# Patient Record
Sex: FEMALE | Race: WHITE
[De-identification: names, ages, dates, MRNs, and addresses within clinical notes are randomized per-mention and may not be internally consistent; named-entity substitution may affect disease eponyms.]

---

## 2017-05-12 ENCOUNTER — HOSPITAL ENCOUNTER (INPATIENT)
Dept: HOSPITAL 47 - EC | Age: 67
LOS: 3 days | Discharge: NURSING, REHAB OR LONG-TERM CARE | DRG: 871 | End: 2017-05-15
Payer: MEDICARE

## 2017-05-12 VITALS — BODY MASS INDEX: 25 KG/M2

## 2017-05-12 DIAGNOSIS — E44.0: ICD-10-CM

## 2017-05-12 DIAGNOSIS — E86.0: ICD-10-CM

## 2017-05-12 DIAGNOSIS — K59.09: ICD-10-CM

## 2017-05-12 DIAGNOSIS — G89.29: ICD-10-CM

## 2017-05-12 DIAGNOSIS — G82.50: ICD-10-CM

## 2017-05-12 DIAGNOSIS — B96.4: ICD-10-CM

## 2017-05-12 DIAGNOSIS — Z87.891: ICD-10-CM

## 2017-05-12 DIAGNOSIS — I10: ICD-10-CM

## 2017-05-12 DIAGNOSIS — Z79.4: ICD-10-CM

## 2017-05-12 DIAGNOSIS — Z87.440: ICD-10-CM

## 2017-05-12 DIAGNOSIS — F03.90: ICD-10-CM

## 2017-05-12 DIAGNOSIS — G93.41: ICD-10-CM

## 2017-05-12 DIAGNOSIS — J44.9: ICD-10-CM

## 2017-05-12 DIAGNOSIS — E11.65: ICD-10-CM

## 2017-05-12 DIAGNOSIS — F32.9: ICD-10-CM

## 2017-05-12 DIAGNOSIS — R74.8: ICD-10-CM

## 2017-05-12 DIAGNOSIS — G35: ICD-10-CM

## 2017-05-12 DIAGNOSIS — N39.0: ICD-10-CM

## 2017-05-12 DIAGNOSIS — N17.9: ICD-10-CM

## 2017-05-12 DIAGNOSIS — A41.9: Primary | ICD-10-CM

## 2017-05-12 DIAGNOSIS — Z99.3: ICD-10-CM

## 2017-05-12 DIAGNOSIS — Z79.899: ICD-10-CM

## 2017-05-12 DIAGNOSIS — E87.0: ICD-10-CM

## 2017-05-12 DIAGNOSIS — E07.9: ICD-10-CM

## 2017-05-12 DIAGNOSIS — D63.8: ICD-10-CM

## 2017-05-12 DIAGNOSIS — E87.6: ICD-10-CM

## 2017-05-12 DIAGNOSIS — M54.5: ICD-10-CM

## 2017-05-12 LAB
ALP SERPL-CCNC: 80 U/L (ref 38–126)
ALT SERPL-CCNC: 24 U/L (ref 9–52)
ANION GAP SERPL CALC-SCNC: 22 MMOL/L
APTT BLD: 22.2 SEC (ref 22–30)
AST SERPL-CCNC: 16 U/L (ref 14–36)
BASOPHILS # BLD AUTO: 0 K/UL (ref 0–0.2)
BASOPHILS NFR BLD AUTO: 0 %
BUN SERPL-SCNC: 34 MG/DL (ref 7–17)
CALCIUM SPEC-MCNC: 11.3 MG/DL (ref 8.4–10.2)
CH: 26.4
CHCM: 31.3
CHLORIDE SERPL-SCNC: 106 MMOL/L (ref 98–107)
CK SERPL-CCNC: 38 U/L (ref 30–135)
CK SERPL-CCNC: 46 U/L (ref 30–135)
CO2 SERPL-SCNC: 22 MMOL/L (ref 22–30)
EOSINOPHIL # BLD AUTO: 0 K/UL (ref 0–0.7)
EOSINOPHIL NFR BLD AUTO: 0 %
ERYTHROCYTE [DISTWIDTH] IN BLOOD BY AUTOMATED COUNT: 5.04 M/UL (ref 3.8–5.4)
ERYTHROCYTE [DISTWIDTH] IN BLOOD: 14.5 % (ref 11.5–15.5)
GLUCOSE BLD-MCNC: 137 MG/DL (ref 75–99)
GLUCOSE SERPL-MCNC: 388 MG/DL (ref 74–99)
GLUCOSE UR QL: (no result)
HCT VFR BLD AUTO: 42.7 % (ref 34–46)
HDW: 2.5
HGB BLD-MCNC: 13.3 GM/DL (ref 11.4–16)
INR PPP: 1.1 (ref ?–1.1)
KETONES UR QL STRIP.AUTO: (no result)
LUC NFR BLD AUTO: 1 %
LYMPHOCYTES # SPEC AUTO: 1.2 K/UL (ref 1–4.8)
LYMPHOCYTES NFR SPEC AUTO: 10 %
MCH RBC QN AUTO: 26.3 PG (ref 25–35)
MCHC RBC AUTO-ENTMCNC: 31 G/DL (ref 31–37)
MCV RBC AUTO: 84.7 FL (ref 80–100)
MONOCYTES # BLD AUTO: 0.3 K/UL (ref 0–1)
MONOCYTES NFR BLD AUTO: 3 %
NEUTROPHILS # BLD AUTO: 9.9 K/UL (ref 1.3–7.7)
NEUTROPHILS NFR BLD AUTO: 87 %
NON-AFRICAN AMERICAN GFR(MDRD): >60
PARTICLE COUNT: (no result)
PH UR: 6.5 [PH] (ref 5–8)
POTASSIUM SERPL-SCNC: 4.6 MMOL/L (ref 3.5–5.1)
PROT SERPL-MCNC: 8.5 G/DL (ref 6.3–8.2)
PROT UR QL: (no result)
PT BLD: 10.9 SEC (ref 9–12)
RBC UR QL: 95 /HPF (ref 0–5)
SODIUM SERPL-SCNC: 150 MMOL/L (ref 137–145)
SP GR UR: 1.02 (ref 1–1.03)
TROPONIN I SERPL-MCNC: 0.3 NG/ML (ref 0–0.03)
TROPONIN I SERPL-MCNC: 0.37 NG/ML (ref 0–0.03)
UA BILLING (MACRO VS. MICRO): (no result)
UROBILINOGEN UR QL STRIP: <2 MG/DL (ref ?–2)
WBC # BLD AUTO: 0.08 10*3/UL
WBC # BLD AUTO: 11.5 K/UL (ref 3.8–10.6)
WBC #/AREA URNS HPF: 84 /HPF (ref 0–5)
WBC (PEROX): 10.89

## 2017-05-12 PROCEDURE — 83036 HEMOGLOBIN GLYCOSYLATED A1C: CPT

## 2017-05-12 PROCEDURE — 71010: CPT

## 2017-05-12 PROCEDURE — 87077 CULTURE AEROBIC IDENTIFY: CPT

## 2017-05-12 PROCEDURE — 85025 COMPLETE CBC W/AUTO DIFF WBC: CPT

## 2017-05-12 PROCEDURE — 84484 ASSAY OF TROPONIN QUANT: CPT

## 2017-05-12 PROCEDURE — 93005 ELECTROCARDIOGRAM TRACING: CPT

## 2017-05-12 PROCEDURE — 80061 LIPID PANEL: CPT

## 2017-05-12 PROCEDURE — 82550 ASSAY OF CK (CPK): CPT

## 2017-05-12 PROCEDURE — 80053 COMPREHEN METABOLIC PANEL: CPT

## 2017-05-12 PROCEDURE — 80048 BASIC METABOLIC PNL TOTAL CA: CPT

## 2017-05-12 PROCEDURE — 82553 CREATINE MB FRACTION: CPT

## 2017-05-12 PROCEDURE — 82140 ASSAY OF AMMONIA: CPT

## 2017-05-12 PROCEDURE — 36415 COLL VENOUS BLD VENIPUNCTURE: CPT

## 2017-05-12 PROCEDURE — 81001 URINALYSIS AUTO W/SCOPE: CPT

## 2017-05-12 PROCEDURE — 71275 CT ANGIOGRAPHY CHEST: CPT

## 2017-05-12 PROCEDURE — 85049 AUTOMATED PLATELET COUNT: CPT

## 2017-05-12 PROCEDURE — 87086 URINE CULTURE/COLONY COUNT: CPT

## 2017-05-12 PROCEDURE — 83735 ASSAY OF MAGNESIUM: CPT

## 2017-05-12 PROCEDURE — 87186 SC STD MICRODIL/AGAR DIL: CPT

## 2017-05-12 PROCEDURE — 84132 ASSAY OF SERUM POTASSIUM: CPT

## 2017-05-12 PROCEDURE — 85730 THROMBOPLASTIN TIME PARTIAL: CPT

## 2017-05-12 PROCEDURE — 85610 PROTHROMBIN TIME: CPT

## 2017-05-12 RX ADMIN — CEFAZOLIN SCH MLS/HR: 330 INJECTION, POWDER, FOR SOLUTION INTRAMUSCULAR; INTRAVENOUS at 23:16

## 2017-05-12 RX ADMIN — METOPROLOL TARTRATE SCH MG: 50 TABLET, FILM COATED ORAL at 23:41

## 2017-05-12 RX ADMIN — SODIUM CHLORIDE SCH MLS/HR: 450 INJECTION, SOLUTION INTRAVENOUS at 16:53

## 2017-05-12 NOTE — XR
EXAMINATION TYPE: XR chest 1V portable

 

DATE OF EXAM: 5/12/2017 4:40 PM

 

COMPARISON: 9/23/2016

 

HISTORY: Altered mental status

 

TECHNIQUE: Single frontal view of the chest is obtained.

 

FINDINGS:  There is no heart failure nor confluent pneumonic infiltrate. There are no hilar masses. C
ostophrenic angles are clear. There are chest leads. Bony thorax is intact.

 

IMPRESSION:  No active cardiopulmonary disease. There is clearing of some atelectasis in both lungs c
ompared to old exam.

## 2017-05-12 NOTE — ED
General Adult HPI





- General


Chief complaint: Altered Mental Status


Stated complaint: POSS UTI


Time Seen by Provider: 05/12/17 14:40


Source: EMS, RN notes reviewed, old records reviewed


Mode of arrival: EMS





- History of Present Illness


Initial comments: 





This is a 58-year-old female the ER for evaluation.  Patient's brought in from 

a psychiatric facility, recently hospital for evaluation of altered mental 

status.  Patient's over some long-term multiple sclerosis.  Patient herself is 

unable to give a clear history secondary critical condition, history is 

obtained from EMS as well as the patient's chart





- Related Data


 Home Medications











 Medication  Instructions  Recorded  Confirmed


 


Acetaminophen [Tylenol] 650 mg PO Q4H PRN 01/31/15 05/12/17


 


Amitriptyline HCl 25 mg PO HS@2100 01/31/15 05/12/17


 


Docusate Sodium [Dulcolax Stool 200 mg PO DAILY@0900 01/31/15 05/12/17





Softener]   


 


Dronabinol 10 mg PO BID@0900,2100 01/31/15 05/12/17


 


Ergocalciferol [Vitamin D2 50,000 unit PO SA 01/31/15 05/12/17





(DRISDOL)]   


 


Escitalopram [Lexapro] 20 mg PO DAILY@0900 01/31/15 05/12/17


 


Insulin Glargine [Lantus] 30 unit SQ DAILY@0900 01/31/15 05/12/17


 


Levothyroxine Sodium [Synthroid] 75 mcg PO DAILY@0600 01/31/15 05/12/17


 


Lisinopril [Prinivil] 20 mg PO DAILY@0900 01/31/15 05/12/17


 


Melatonin 3 mg PO HS PRN 01/31/15 05/12/17


 


Methenamine Hippurate 1 gm PO BID@0900,2100 01/31/15 05/12/17


 


Omeprazole [PriLOSEC] 20 mg PO DAILY@0600 01/31/15 05/12/17


 


Polyethylene Glycol 3350 [Miralax] 17 gm PO BID@0900,2100 01/31/15 05/12/17


 


Sennosides [Senokot] 17.2 mg PO DAILY@0900 01/31/15 05/12/17


 


Artificial Tears-Hypromellose 1 drop BOTH EYES BID PRN 09/20/16 05/12/17





[Artificial Tear Drops]   


 


Atorvastatin [Lipitor] 20 mg PO HS@2100 09/20/16 05/12/17


 


Bisacodyl 10 mg RECTAL DAILY PRN 09/20/16 05/12/17


 


Calcium Carbonate [Calcium] 600 mg PO BID@0900,2100 09/20/16 05/12/17


 


Fenofibrate Nanocrystallized 160 mg PO HS@2100 09/20/16 05/12/17





[Tricor]   


 


Glucagen Hypokit Solution  1 mg IM Q10M PRN 09/20/16 05/12/17


 


Inzo Barrier Cream 1 applic TOPICAL BID 09/20/16 05/12/17


 


Magnesium Citrate [Citrate of 300 ml PO Q72H PRN 09/20/16 05/12/17





Magnesia]   


 


Naloxegol Oxalate [Movantik] 25 mg PO HS@2100 09/20/16 05/12/17


 


Antifungal Powder 1 applic TOPICAL DAILY 05/12/17 05/12/17


 


Insulin Aspart [NovoLOG Flexpen] See Protocol SQ ACHS 05/12/17 05/12/17


 


amLODIPine [Norvasc] 5 mg PO DAILY@0900 05/12/17 05/12/17








 Previous Rx's











 Medication  Instructions  Recorded


 


Diazepam [Valium] 5 mg PO TID PRN #21 tab 09/26/16


 


fentaNYL 100MCG/HR PATCH 1 patch TRANSDERM Q72H #2 patch 09/26/16





[Duragesic 100MCG/HR]  


 


traMADol HCL [Ultram ER] 300 mg PO DAILY@0600 #7 tab.er.24h 09/26/16











 Allergies











Allergy/AdvReac Type Severity Reaction Status Date / Time


 


No Known Allergies Allergy   Verified 05/12/17 14:39














Review of Systems


ROS Statement: 


Those systems with pertinent positive or pertinent negative responses have been 

documented in the HPI.





ROS Other: All systems not noted in ROS Statement are negative.





Past Medical History


Past Medical History: COPD, CVA/TIA, Diabetes Mellitus, Hypertension, Memory 

Impairment


Additional Past Medical History / Comment(s): UTI, multiple sclerosis, chronic 

constipation, abdominal distention


History of Any Multi-Drug Resistant Organisms: ESBL


Date of last positivie culture/infection: 9/20/16


MDRO Source:: Urine AND BLOOD-E.coli ESBL


Past Surgical History: Adenoidectomy, Tonsillectomy


Additional Past Surgical History / Comment(s): right leg ORIF, multiple scopes


Past Psychological History: No Psychological Hx Reported


Additional Psychological History / Comment(s): resident [facility], hx of ESBL


Smoking Status: Former smoker


Past Alcohol Use History: None Reported


Past Drug Use History: None Reported





- Past Family History


  ** Mother


History Unknown: Yes





  ** Father


History Unknown: Yes





General Exam


Limitations: altered mental status


General appearance: alert, in distress


Head exam: Present: atraumatic, normocephalic, normal inspection


Eye exam: Present: normal appearance, PERRL, EOMI.  Absent: scleral icterus, 

conjunctival injection, periorbital swelling


ENT exam: Present: normal exam, mucous membranes moist


Neck exam: Present: normal inspection.  Absent: tenderness, meningismus, 

lymphadenopathy


Respiratory exam: Present: normal lung sounds bilaterally.  Absent: respiratory 

distress, wheezes, rales, rhonchi, stridor


Cardiovascular Exam: Present: normal rhythm, tachycardia, normal heart sounds.  

Absent: systolic murmur, diastolic murmur, rubs, gallop, clicks


GI/Abdominal exam: Present: soft, normal bowel sounds.  Absent: distended, 

tenderness, guarding, rebound, rigid


Extremities exam: Present: normal inspection, full ROM, normal capillary 

refill.  Absent: tenderness, pedal edema, joint swelling, calf tenderness


Back exam: Present: normal inspection


Neurological exam: Present: alert, oriented X3, CN II-XII intact


Psychiatric exam: Present: normal affect, normal mood


Skin exam: Present: warm, dry, intact, normal color.  Absent: rash





Course


 Vital Signs











  05/12/17





  14:11


 


Temperature 98.6 F


 


Pulse Rate 130 H


 


Respiratory 18





Rate 


 


Blood Pressure 192/89


 


O2 Sat by Pulse 96





Oximetry 














- Reevaluation(s)


Reevaluation #1: 





05/12/17 16:42


She remains clinically same, and still unable to answer questions, persistently 

tachycardic





EKG Findings





- EKG Comments:


EKG Findings:: EKG shows sinus tachycardia rate 127, , QRS I4, 





Medical Decision Making





- Medical Decision Making





66 female the ER for evaluation of severe weakness.  Patient unable to give 

history, this is per staff, this is found to have non-ST elevated MI, severe 

tachycardia.





- Lab Data


Result diagrams: 


 05/12/17 14:20





 05/12/17 14:20


 Lab Results











  05/12/17 05/12/17 05/12/17 Range/Units





  14:20 14:20 14:20 


 


WBC   11.5 H   (3.8-10.6)  k/uL


 


RBC   5.04   (3.80-5.40)  m/uL


 


Hgb   13.3   (11.4-16.0)  gm/dL


 


Hct   42.7   (34.0-46.0)  %


 


MCV   84.7   (80.0-100.0)  fL


 


MCH   26.3   (25.0-35.0)  pg


 


MCHC   31.0   (31.0-37.0)  g/dL


 


RDW   14.5   (11.5-15.5)  %


 


Plt Count   455 H   (150-450)  k/uL


 


Neutrophils %   87   %


 


Lymphocytes %   10   %


 


Monocytes %   3   %


 


Eosinophils %   0   %


 


Basophils %   0   %


 


Neutrophils #   9.9 H   (1.3-7.7)  k/uL


 


Lymphocytes #   1.2   (1.0-4.8)  k/uL


 


Monocytes #   0.3   (0-1.0)  k/uL


 


Eosinophils #   0.0   (0-0.7)  k/uL


 


Basophils #   0.0   (0-0.2)  k/uL


 


Hypochromasia   Slight   


 


PT     (9.0-12.0)  sec


 


INR     (<1.1)  


 


APTT     (22.0-30.0)  sec


 


Sodium    150 H  (137-145)  mmol/L


 


Potassium    4.6  (3.5-5.1)  mmol/L


 


Chloride    106  ()  mmol/L


 


Carbon Dioxide    22  (22-30)  mmol/L


 


Anion Gap    22  mmol/L


 


BUN    34 H  (7-17)  mg/dL


 


Creatinine    0.90  (0.52-1.04)  mg/dL


 


Est GFR (MDRD) Af Amer    >60  (>60 ml/min/1.73 sqM)  


 


Est GFR (MDRD) Non-Af    >60  (>60 ml/min/1.73 sqM)  


 


Glucose    388 H  (74-99)  mg/dL


 


Calcium    11.3 H  (8.4-10.2)  mg/dL


 


Total Bilirubin    0.7  (0.2-1.3)  mg/dL


 


AST    16  (14-36)  U/L


 


ALT    24  (9-52)  U/L


 


Alkaline Phosphatase    80  ()  U/L


 


Ammonia     (<30)  umol/L


 


Total Creatine Kinase  38    ()  U/L


 


CK-MB (CK-2)  2.7 H*    (0.0-2.4)  ng/mL


 


CK-MB (CK-2) Rel Index  7.1    


 


Troponin I  0.305 H*    (0.000-0.034)  ng/mL


 


Total Protein    8.5 H  (6.3-8.2)  g/dL


 


Albumin    4.8  (3.5-5.0)  g/dL














  05/12/17 05/12/17 Range/Units





  14:20 14:20 


 


WBC    (3.8-10.6)  k/uL


 


RBC    (3.80-5.40)  m/uL


 


Hgb    (11.4-16.0)  gm/dL


 


Hct    (34.0-46.0)  %


 


MCV    (80.0-100.0)  fL


 


MCH    (25.0-35.0)  pg


 


MCHC    (31.0-37.0)  g/dL


 


RDW    (11.5-15.5)  %


 


Plt Count    (150-450)  k/uL


 


Neutrophils %    %


 


Lymphocytes %    %


 


Monocytes %    %


 


Eosinophils %    %


 


Basophils %    %


 


Neutrophils #    (1.3-7.7)  k/uL


 


Lymphocytes #    (1.0-4.8)  k/uL


 


Monocytes #    (0-1.0)  k/uL


 


Eosinophils #    (0-0.7)  k/uL


 


Basophils #    (0-0.2)  k/uL


 


Hypochromasia    


 


PT  10.9   (9.0-12.0)  sec


 


INR  1.1   (<1.1)  


 


APTT  22.2   (22.0-30.0)  sec


 


Sodium    (137-145)  mmol/L


 


Potassium    (3.5-5.1)  mmol/L


 


Chloride    ()  mmol/L


 


Carbon Dioxide    (22-30)  mmol/L


 


Anion Gap    mmol/L


 


BUN    (7-17)  mg/dL


 


Creatinine    (0.52-1.04)  mg/dL


 


Est GFR (MDRD) Af Amer    (>60 ml/min/1.73 sqM)  


 


Est GFR (MDRD) Non-Af    (>60 ml/min/1.73 sqM)  


 


Glucose    (74-99)  mg/dL


 


Calcium    (8.4-10.2)  mg/dL


 


Total Bilirubin    (0.2-1.3)  mg/dL


 


AST    (14-36)  U/L


 


ALT    (9-52)  U/L


 


Alkaline Phosphatase    ()  U/L


 


Ammonia   <9  (<30)  umol/L


 


Total Creatine Kinase    ()  U/L


 


CK-MB (CK-2)    (0.0-2.4)  ng/mL


 


CK-MB (CK-2) Rel Index    


 


Troponin I    (0.000-0.034)  ng/mL


 


Total Protein    (6.3-8.2)  g/dL


 


Albumin    (3.5-5.0)  g/dL














- Radiology Data


Radiology results: report reviewed (Chest x-ray is negative for acute disease), 

image reviewed





Critical Care Time


Critical Care Time: Yes


Total Critical Care Time: 31





Disposition


Clinical Impression: 


 Altered mental status, Multiple sclerosis, primary chronic progressive, NSTEMI 

(non-ST elevated myocardial infarction)





Disposition: ADMITTED AS IP TO THIS [facility]


Condition: Serious


Referrals: 


Stromberg,Reid, MD [Primary Care Provider] - 1-2 days

## 2017-05-12 NOTE — CT
EXAMINATION TYPE: CT angio chest

 

DATE OF EXAM: 5/12/2017 6:14 PM

 

COMPARISON: NONE

 

HISTORY: Patient poor historian.  Patient is dehydrated per family and shows increased signs of alter
ed mental status.

 

CT DLP: 591 mGycm

Automated exposure control for dose reduction was used.

 

CONTRAST: 

CTA scan of the thorax is performed with IV Contrast, patient injected with 100 mL of Omnipaque 350, 
pulmonary embolism protocol.  There are 3-D post processed images..  

 

FINDINGS:

 

There is patchy atelectasis in both mid and lower lung fields and worse on the right side. There is s
light elevated right diaphragm. There is no pleural effusion. There is no evidence of thoracic aortic
 aneurysm or dissection. I see no filling defects in the pulmonary arteries. There is no mediastinal 
adenopathy. There are no hilar masses. There is no pericardial effusion. There is some thoracic kypho
tic deformity. There is no evidence of a pulmonary mass.

 

 

IMPRESSION: 

NO EVIDENCE OF PULMONARY EMBOLISM. THERE IS MILD BILATERAL PATCHY ATELECTASIS.

## 2017-05-13 LAB
ALP SERPL-CCNC: 51 U/L (ref 38–126)
ALT SERPL-CCNC: 28 U/L (ref 9–52)
ANION GAP SERPL CALC-SCNC: 8 MMOL/L
AST SERPL-CCNC: 12 U/L (ref 14–36)
BASOPHILS # BLD AUTO: 0 K/UL (ref 0–0.2)
BASOPHILS NFR BLD AUTO: 0 %
BUN SERPL-SCNC: 19 MG/DL (ref 7–17)
CALCIUM SPEC-MCNC: 8.2 MG/DL (ref 8.4–10.2)
CH: 26.2
CHCM: 29.7
CHLORIDE SERPL-SCNC: 120 MMOL/L (ref 98–107)
CHOLEST SERPL-MCNC: 126 MG/DL (ref ?–200)
CK SERPL-CCNC: 54 U/L (ref 30–135)
CO2 SERPL-SCNC: 21 MMOL/L (ref 22–30)
EOSINOPHIL # BLD AUTO: 0.1 K/UL (ref 0–0.7)
EOSINOPHIL NFR BLD AUTO: 1 %
ERYTHROCYTE [DISTWIDTH] IN BLOOD BY AUTOMATED COUNT: 3.85 M/UL (ref 3.8–5.4)
ERYTHROCYTE [DISTWIDTH] IN BLOOD: 14.5 % (ref 11.5–15.5)
GLUCOSE BLD-MCNC: 192 MG/DL (ref 75–99)
GLUCOSE BLD-MCNC: 266 MG/DL (ref 75–99)
GLUCOSE BLD-MCNC: 98 MG/DL (ref 75–99)
GLUCOSE SERPL-MCNC: 177 MG/DL (ref 74–99)
HCT VFR BLD AUTO: 34.1 % (ref 34–46)
HDLC SERPL-MCNC: 51 MG/DL (ref 40–60)
HDW: 2.47
HGB BLD-MCNC: 10.1 GM/DL (ref 11.4–16)
LUC NFR BLD AUTO: 2 %
LYMPHOCYTES # SPEC AUTO: 1.8 K/UL (ref 1–4.8)
LYMPHOCYTES NFR SPEC AUTO: 23 %
MCH RBC QN AUTO: 26.2 PG (ref 25–35)
MCHC RBC AUTO-ENTMCNC: 29.5 G/DL (ref 31–37)
MCV RBC AUTO: 88.7 FL (ref 80–100)
MONOCYTES # BLD AUTO: 0.5 K/UL (ref 0–1)
MONOCYTES NFR BLD AUTO: 6 %
NEUTROPHILS # BLD AUTO: 5.3 K/UL (ref 1.3–7.7)
NEUTROPHILS NFR BLD AUTO: 68 %
NON-AFRICAN AMERICAN GFR(MDRD): >60
POTASSIUM SERPL-SCNC: 3.2 MMOL/L (ref 3.5–5.1)
PROT SERPL-MCNC: 6.1 G/DL (ref 6.3–8.2)
SODIUM SERPL-SCNC: 149 MMOL/L (ref 137–145)
TRIGL SERPL-MCNC: 205 MG/DL (ref ?–150)
TROPONIN I SERPL-MCNC: 0.24 NG/ML (ref 0–0.03)
WBC # BLD AUTO: 0.11 10*3/UL
WBC # BLD AUTO: 7.7 K/UL (ref 3.8–10.6)
WBC (PEROX): 7.58

## 2017-05-13 RX ADMIN — ASPIRIN 325 MG ORAL TABLET SCH: 325 PILL ORAL at 10:24

## 2017-05-13 RX ADMIN — METOPROLOL TARTRATE SCH MG: 50 TABLET, FILM COATED ORAL at 11:06

## 2017-05-13 RX ADMIN — ATORVASTATIN CALCIUM SCH MG: 80 TABLET, FILM COATED ORAL at 10:16

## 2017-05-13 RX ADMIN — SODIUM CHLORIDE SCH MLS/HR: 9 INJECTION, SOLUTION INTRAVENOUS at 16:42

## 2017-05-13 RX ADMIN — NITROGLYCERIN SCH INCH: 20 OINTMENT TOPICAL at 17:24

## 2017-05-13 RX ADMIN — METOPROLOL TARTRATE SCH MG: 50 TABLET, FILM COATED ORAL at 10:16

## 2017-05-13 RX ADMIN — ATORVASTATIN CALCIUM SCH: 80 TABLET, FILM COATED ORAL at 10:24

## 2017-05-13 RX ADMIN — ASPIRIN 325 MG ORAL TABLET SCH MG: 325 PILL ORAL at 11:06

## 2017-05-13 RX ADMIN — METOPROLOL TARTRATE SCH: 50 TABLET, FILM COATED ORAL at 10:24

## 2017-05-13 RX ADMIN — POTASSIUM CHLORIDE SCH MLS/HR: 14.9 INJECTION, SOLUTION INTRAVENOUS at 12:51

## 2017-05-13 RX ADMIN — CEFAZOLIN SCH MLS/HR: 330 INJECTION, POWDER, FOR SOLUTION INTRAMUSCULAR; INTRAVENOUS at 06:54

## 2017-05-13 RX ADMIN — NITROGLYCERIN SCH INCH: 20 OINTMENT TOPICAL at 11:13

## 2017-05-13 RX ADMIN — INSULIN LISPRO SCH UNIT: 100 INJECTION, SOLUTION INTRAVENOUS; SUBCUTANEOUS at 17:23

## 2017-05-13 RX ADMIN — ATORVASTATIN CALCIUM SCH MG: 80 TABLET, FILM COATED ORAL at 11:06

## 2017-05-13 RX ADMIN — ASPIRIN 325 MG ORAL TABLET SCH MG: 325 PILL ORAL at 10:16

## 2017-05-13 RX ADMIN — SODIUM CHLORIDE SCH: 450 INJECTION, SOLUTION INTRAVENOUS at 19:35

## 2017-05-13 RX ADMIN — SODIUM CHLORIDE SCH MLS/HR: 9 INJECTION, SOLUTION INTRAVENOUS at 14:41

## 2017-05-13 RX ADMIN — INSULIN LISPRO SCH: 100 INJECTION, SOLUTION INTRAVENOUS; SUBCUTANEOUS at 12:51

## 2017-05-13 NOTE — P.HPIM
History of Present Illness


H&P Date: 05/13/17


Chief Complaint: Confusion


Keena is a 66-year-old white female well-known to me from [facility] on the [facility].  She has advanced multiple sclerosis and only use of her right 

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  Staff had reported last week 

that she been getting confused off and on.  A urinalysis and chest x-ray was 

done.  The urinalysis was never taken as they were unable to put in a Beck 

catheter.  He had not been contacted about her until yesterday, when she was 

very confused.  They had reported to me over the past several days of 

significantly elevated sugars.  This is not out of the norm for Keena.  Was a 

brittle diabetic, and frequently has hypo-and hyperglycemia.  She was found to 

have a UTI.  She is here for treatment regarding the confusion.  Her only while 

she is awake and alert, she is confused and unable to answer most questions I 

ask her today.  He does recognize me on entering her room.  and asks of my 

family.





Review of Systems


ROS unobtainable: due to mental status





Past Medical History


Past Medical History: Chest Pain / Angina, COPD, CVA/TIA, Diabetes Mellitus, 

Hypertension, Memory Impairment, Neurologic Disorder (Multiple sclerosis), 

Thyroid Disorder


Additional Past Medical History / Comment(s): Current UTI, on suppression 

treatment, chronic constipation, abdominal chronic pain.


History of Any Multi-Drug Resistant Organisms: ESBL


Date of last positivie culture/infection: 9/20/16


MDRO Source:: Urine AND BLOOD-E.coli ESBL


Past Surgical History: Adenoidectomy, Tonsillectomy


Additional Past Surgical History / Comment(s): right leg ORIF, multiple scopes


Past Psychological History: Depression


Additional Psychological History / Comment(s): resident [facility],  of ESBL


Smoking Status: Former smoker


Past Alcohol Use History: None Reported


Past Drug Use History: None Reported





- Past Family History


  ** Mother


History Unknown: Yes





  ** Father


History Unknown: Yes





Medications and Allergies


 Home Medications











 Medication  Instructions  Recorded  Confirmed  Type


 


Acetaminophen [Tylenol] 650 mg PO Q4H PRN 01/31/15 05/12/17 History


 


Amitriptyline HCl 25 mg PO HS@2100 01/31/15 05/12/17 History


 


Docusate Sodium [Dulcolax Stool 200 mg PO DAILY@0900 01/31/15 05/12/17 History





Softener]    


 


Dronabinol 10 mg PO BID@0900,2100 01/31/15 05/12/17 History


 


Ergocalciferol [Vitamin D2 50,000 unit PO SA 01/31/15 05/12/17 History





(DRISDOL)]    


 


Escitalopram [Lexapro] 20 mg PO DAILY@0900 01/31/15 05/12/17 History


 


Insulin Glargine [Lantus] 30 unit SQ DAILY@0900 01/31/15 05/12/17 History


 


Levothyroxine Sodium [Synthroid] 75 mcg PO DAILY@0600 01/31/15 05/12/17 History


 


Lisinopril [Prinivil] 20 mg PO DAILY@0900 01/31/15 05/12/17 History


 


Melatonin 3 mg PO HS PRN 01/31/15 05/12/17 History


 


Methenamine Hippurate 1 gm PO BID@0900,2100 01/31/15 05/12/17 History


 


Omeprazole [PriLOSEC] 20 mg PO DAILY@0600 01/31/15 05/12/17 History


 


Polyethylene Glycol 3350 [Miralax] 17 gm PO BID@0900,2100 01/31/15 05/12/17 

History


 


Sennosides [Senokot] 17.2 mg PO DAILY@0900 01/31/15 05/12/17 History


 


Artificial Tears-Hypromellose 1 drop BOTH EYES BID PRN 09/20/16 05/12/17 History





[Artificial Tear Drops]    


 


Atorvastatin [Lipitor] 20 mg PO HS@2100 09/20/16 05/12/17 History


 


Bisacodyl 10 mg RECTAL DAILY PRN 09/20/16 05/12/17 History


 


Calcium Carbonate [Calcium] 600 mg PO BID@0900,2100 09/20/16 05/12/17 History


 


Fenofibrate Nanocrystallized 160 mg PO HS@2100 09/20/16 05/12/17 History





[Tricor]    


 


Glucagen Hypokit Solution  1 mg IM Q10M PRN 09/20/16 05/12/17 History


 


Inzo Barrier Cream 1 applic TOPICAL BID 09/20/16 05/12/17 History


 


Magnesium Citrate [Citrate of 300 ml PO Q72H PRN 09/20/16 05/12/17 History





Magnesia]    


 


Naloxegol Oxalate [Movantik] 25 mg PO HS@2100 09/20/16 05/12/17 History


 


Antifungal Powder 1 applic TOPICAL DAILY 05/12/17 05/12/17 History


 


Insulin Aspart [NovoLOG Flexpen] See Protocol SQ ACHS 05/12/17 05/12/17 History


 


amLODIPine [Norvasc] 5 mg PO DAILY@0900 05/12/17 05/12/17 History











 Allergies











Allergy/AdvReac Type Severity Reaction Status Date / Time


 


No Known Allergies Allergy   Verified 05/12/17 14:39














Physical Exam


Vitals: 


 Vital Signs











  Temp Pulse Pulse Resp BP BP Pulse Ox


 


 05/13/17 11:52  98.6 F   67  16   157/72  100


 


 05/13/17 08:30  99.0 F   74  16   158/70  99


 


 05/13/17 06:47   73   16  176/92   98


 


 05/13/17 04:28   69   16  177/82   97


 


 05/13/17 01:38   96   16  169/75   97


 


 05/13/17 00:20   98   16  165/79   97


 


 05/12/17 23:10   92   16  180/86   97


 


 05/12/17 19:36   89   16  160/73   97


 


 05/12/17 19:12   95   18  149/81   96


 


 05/12/17 18:45   106 H   18  179/80   96


 


 05/12/17 17:44  100.6 F H   56 L  16   126/77  100


 


 05/12/17 17:04  99.9 F H  126 H   16  185/84   99


 


 05/12/17 17:03   128 H   16  172/85   96








 Intake and Output











 05/12/17 05/13/17 05/13/17





 22:59 06:59 14:59


 


Intake Total  242.87 250


 


Output Total   1000


 


Balance  242.87 -750


 


Intake:   


 


  Intake, IV Titration  242.87 250





  Amount   


 


    Heparin Sodium,Porcine/  242.87 





    D5w Pmx 25,000 unit In   





    Dextrose/Water 1 500ml.   





    bag @ 12 UNITS/KG/HR 17.   





    41 mls/hr IV .Q24H ANUJA Rx   





    #:[id number]   


 


    Sodium Chloride 0.9% 1,   250





    000 ml @ 100 mls/hr IV .   





    Q10H ANUJA Rx#:[id number]   


 


Output:   


 


  Urine   1000


 


Other:   


 


  Voiding Method   Indwelling Catheter


 


  Weight 72.575 kg  














GENERAL: Fatigued, thin and in no acute distress.


HEAD: Atraumatic, normocephalic.


EYES: Pupils equal round and reactive to light, extraocular movements intact, 

sclera anicteric, conjunctiva are normal.


ENT:nares patent, oropharynx clear without exudates.  Moist mucous membranes.


NECK: Normal range of motion, supple without lymphadenopathy or JVD, no 

thyromegaly


LUNGS: Breath sounds clear to auscultation bilaterally and equal.  No wheezes 

rales or rhonchi.


HEART: Regular rate and rhythm without murmurs, rubs or gallops.S1S2 Normal


ABDOMEN: Soft, , normoactive bowel sounds.  No guarding, no rebound.  There 

distended due to chronic fecal retention


EXTREMITIES: Atrophy to all with only use of her right upper extremity.


NEUROLOGICAL: Cranial nerves II through XII grossly intact.  Normal speech, 

nonambulatory, wheelchair bound


PSYCH: Normal mood, normal affect.


SKIN: Warm, Dry, normal turgor, no rashes or lesions noted.





Results


CBC & Chem 7: 


 05/13/17 10:56





 05/13/17 02:40


Labs: 


 Abnormal Lab Results - Last 24 Hours (Table)











  05/12/17 05/12/17 05/12/17 Range/Units





  16:50 20:27 20:50 


 


Hgb     (11.4-16.0)  gm/dL


 


MCHC     (31.0-37.0)  g/dL


 


APTT     (22.0-30.0)  sec


 


Sodium     (137-145)  mmol/L


 


Potassium     (3.5-5.1)  mmol/L


 


Chloride     ()  mmol/L


 


Carbon Dioxide     (22-30)  mmol/L


 


BUN     (7-17)  mg/dL


 


Glucose     (74-99)  mg/dL


 


POC Glucose (mg/dL)   137 H   (75-99)  mg/dL


 


Calcium     (8.4-10.2)  mg/dL


 


AST     (14-36)  U/L


 


CK-MB (CK-2)    2.9 H*  (0.0-2.4)  ng/mL


 


Troponin I    0.369 H*  (0.000-0.034)  ng/mL


 


Total Protein     (6.3-8.2)  g/dL


 


Albumin     (3.5-5.0)  g/dL


 


Triglycerides     (<150)  mg/dL


 


Urine Appearance  Cloudy H    (Clear)  


 


Urine Protein  2+ H    (Negative)  


 


Urine Glucose (UA)  4+ H    (Negative)  


 


Urine Ketones  2+ H    (Negative)  


 


Urine Blood  Moderate H    (Negative)  


 


Ur Leukocyte Esterase  Large H    (Negative)  


 


Urine RBC  95 H    (0-5)  /hpf


 


Urine WBC  84 H    (0-5)  /hpf


 


Urine WBC Clumps  Few H    (None)  /hpf


 


Urine Bacteria  Rare H    (None)  /hpf


 


Urine Mucus  Rare H    (None)  /hpf


 


Urine Yeast (Budding)  Few H    (None)  /hpf














  05/12/17 05/13/17 05/13/17 Range/Units





  22:45 02:40 02:40 


 


Hgb     (11.4-16.0)  gm/dL


 


MCHC     (31.0-37.0)  g/dL


 


APTT  60.6 H   91.9 H  (22.0-30.0)  sec


 


Sodium     (137-145)  mmol/L


 


Potassium     (3.5-5.1)  mmol/L


 


Chloride     ()  mmol/L


 


Carbon Dioxide     (22-30)  mmol/L


 


BUN     (7-17)  mg/dL


 


Glucose     (74-99)  mg/dL


 


POC Glucose (mg/dL)     (75-99)  mg/dL


 


Calcium     (8.4-10.2)  mg/dL


 


AST     (14-36)  U/L


 


CK-MB (CK-2)   2.9 H*   (0.0-2.4)  ng/mL


 


Troponin I   0.244 H*   (0.000-0.034)  ng/mL


 


Total Protein     (6.3-8.2)  g/dL


 


Albumin     (3.5-5.0)  g/dL


 


Triglycerides     (<150)  mg/dL


 


Urine Appearance     (Clear)  


 


Urine Protein     (Negative)  


 


Urine Glucose (UA)     (Negative)  


 


Urine Ketones     (Negative)  


 


Urine Blood     (Negative)  


 


Ur Leukocyte Esterase     (Negative)  


 


Urine RBC     (0-5)  /hpf


 


Urine WBC     (0-5)  /hpf


 


Urine WBC Clumps     (None)  /hpf


 


Urine Bacteria     (None)  /hpf


 


Urine Mucus     (None)  /hpf


 


Urine Yeast (Budding)     (None)  /hpf














  05/13/17 05/13/17 05/13/17 Range/Units





  02:40 02:40 10:56 


 


Hgb    10.1 L D  (11.4-16.0)  gm/dL


 


MCHC    29.5 L  (31.0-37.0)  g/dL


 


APTT     (22.0-30.0)  sec


 


Sodium   149 H   (137-145)  mmol/L


 


Potassium   3.2 L   (3.5-5.1)  mmol/L


 


Chloride   120 H*   ()  mmol/L


 


Carbon Dioxide   21 L   (22-30)  mmol/L


 


BUN   19 H   (7-17)  mg/dL


 


Glucose   177 H   (74-99)  mg/dL


 


POC Glucose (mg/dL)     (75-99)  mg/dL


 


Calcium   8.2 L   (8.4-10.2)  mg/dL


 


AST   12 L   (14-36)  U/L


 


CK-MB (CK-2)     (0.0-2.4)  ng/mL


 


Troponin I     (0.000-0.034)  ng/mL


 


Total Protein   6.1 L   (6.3-8.2)  g/dL


 


Albumin   3.1 L   (3.5-5.0)  g/dL


 


Triglycerides  205 H    (<150)  mg/dL


 


Urine Appearance     (Clear)  


 


Urine Protein     (Negative)  


 


Urine Glucose (UA)     (Negative)  


 


Urine Ketones     (Negative)  


 


Urine Blood     (Negative)  


 


Ur Leukocyte Esterase     (Negative)  


 


Urine RBC     (0-5)  /hpf


 


Urine WBC     (0-5)  /hpf


 


Urine WBC Clumps     (None)  /hpf


 


Urine Bacteria     (None)  /hpf


 


Urine Mucus     (None)  /hpf


 


Urine Yeast (Budding)     (None)  /hpf














  05/13/17 Range/Units





  13:24 


 


Hgb   (11.4-16.0)  gm/dL


 


MCHC   (31.0-37.0)  g/dL


 


APTT  36.7 H  (22.0-30.0)  sec


 


Sodium   (137-145)  mmol/L


 


Potassium   (3.5-5.1)  mmol/L


 


Chloride   ()  mmol/L


 


Carbon Dioxide   (22-30)  mmol/L


 


BUN   (7-17)  mg/dL


 


Glucose   (74-99)  mg/dL


 


POC Glucose (mg/dL)   (75-99)  mg/dL


 


Calcium   (8.4-10.2)  mg/dL


 


AST   (14-36)  U/L


 


CK-MB (CK-2)   (0.0-2.4)  ng/mL


 


Troponin I   (0.000-0.034)  ng/mL


 


Total Protein   (6.3-8.2)  g/dL


 


Albumin   (3.5-5.0)  g/dL


 


Triglycerides   (<150)  mg/dL


 


Urine Appearance   (Clear)  


 


Urine Protein   (Negative)  


 


Urine Glucose (UA)   (Negative)  


 


Urine Ketones   (Negative)  


 


Urine Blood   (Negative)  


 


Ur Leukocyte Esterase   (Negative)  


 


Urine RBC   (0-5)  /hpf


 


Urine WBC   (0-5)  /hpf


 


Urine WBC Clumps   (None)  /hpf


 


Urine Bacteria   (None)  /hpf


 


Urine Mucus   (None)  /hpf


 


Urine Yeast (Budding)   (None)  /hpf








 Microbiology - Last 24 Hours (Table)











 05/12/17 16:50 Urine Culture - Preliminary





 Urine,Catheterized 











Chest x-ray: report reviewed


CT scan - chest: report reviewed





Thrombosis Risk Factor Assmnt





- Choose All That Apply


Any of the Below Risk Factors Present?: No


Each Risk Factor Represents 2 Points: Age 61-74 years


Thrombosis Risk Factor Assessment Total Risk Factor Score: 2


Thrombosis Risk Factor Assessment Level: Low Risk





Assessment and Plan


Plan: 


Metabolic encephalopathy due to UTI and multiple sclerosis: She is receiving IV 

fluids.  I will add Rocephin to her regimen.


Sepsis: She had a leukocytosis, fever and confusion.  We'll monitor.  We'll 

consult neurology


Abnormal troponins: Cardiology consultation, with the recommendations.


Hypernatremia: Due to dehydration, will gently hydrate her with D5 half-normal 

saline.


Hypokalemia: We'll put on potassium place protocol.


Chronic recurrent UTI, on suppression therapy well and I'll restart her 

methenamine.


Chronic low back pain: We'll decrease her pain medications and hold her Marinol 

this time.


Moderate malnutrition: I'll hold her Marinol this time due to the side effects 

mentation issues.





sHe will be reevaluated in the next 24 hours.

## 2017-05-14 LAB
ANION GAP SERPL CALC-SCNC: 9 MMOL/L
BASOPHILS # BLD AUTO: 0 K/UL (ref 0–0.2)
BASOPHILS NFR BLD AUTO: 1 %
BUN SERPL-SCNC: 12 MG/DL (ref 7–17)
CALCIUM SPEC-MCNC: 7.9 MG/DL (ref 8.4–10.2)
CH: 26.1
CHCM: 30.6
CHLORIDE SERPL-SCNC: 108 MMOL/L (ref 98–107)
CO2 SERPL-SCNC: 22 MMOL/L (ref 22–30)
EOSINOPHIL # BLD AUTO: 0.1 K/UL (ref 0–0.7)
EOSINOPHIL NFR BLD AUTO: 3 %
ERYTHROCYTE [DISTWIDTH] IN BLOOD BY AUTOMATED COUNT: 3.7 M/UL (ref 3.8–5.4)
ERYTHROCYTE [DISTWIDTH] IN BLOOD: 14.4 % (ref 11.5–15.5)
GLUCOSE BLD-MCNC: 250 MG/DL (ref 75–99)
GLUCOSE BLD-MCNC: 251 MG/DL (ref 75–99)
GLUCOSE BLD-MCNC: 260 MG/DL (ref 75–99)
GLUCOSE BLD-MCNC: 411 MG/DL (ref 75–99)
GLUCOSE SERPL-MCNC: 240 MG/DL (ref 74–99)
HCT VFR BLD AUTO: 31.7 % (ref 34–46)
HDW: 2.48
HEMOGLOBIN A1C: 8.9 % (ref 4.2–6.1)
HGB BLD-MCNC: 9.8 GM/DL (ref 11.4–16)
LUC NFR BLD AUTO: 2 %
LYMPHOCYTES # SPEC AUTO: 1.6 K/UL (ref 1–4.8)
LYMPHOCYTES NFR SPEC AUTO: 39 %
MCH RBC QN AUTO: 26.4 PG (ref 25–35)
MCHC RBC AUTO-ENTMCNC: 30.9 G/DL (ref 31–37)
MCV RBC AUTO: 85.6 FL (ref 80–100)
MONOCYTES # BLD AUTO: 0.2 K/UL (ref 0–1)
MONOCYTES NFR BLD AUTO: 4 %
NEUTROPHILS # BLD AUTO: 2 K/UL (ref 1.3–7.7)
NEUTROPHILS NFR BLD AUTO: 52 %
NON-AFRICAN AMERICAN GFR(MDRD): >60
POTASSIUM SERPL-SCNC: 3.5 MMOL/L (ref 3.5–5.1)
SODIUM SERPL-SCNC: 139 MMOL/L (ref 137–145)
WBC # BLD AUTO: 0.06 10*3/UL
WBC # BLD AUTO: 3.9 K/UL (ref 3.8–10.6)
WBC (PEROX): 4

## 2017-05-14 RX ADMIN — INSULIN LISPRO SCH: 100 INJECTION, SOLUTION INTRAVENOUS; SUBCUTANEOUS at 00:30

## 2017-05-14 RX ADMIN — POTASSIUM CHLORIDE SCH: 14.9 INJECTION, SOLUTION INTRAVENOUS at 02:19

## 2017-05-14 RX ADMIN — POTASSIUM CHLORIDE SCH MEQ: 20 TABLET, EXTENDED RELEASE ORAL at 03:23

## 2017-05-14 RX ADMIN — INSULIN LISPRO SCH UNIT: 100 INJECTION, SOLUTION INTRAVENOUS; SUBCUTANEOUS at 12:02

## 2017-05-14 RX ADMIN — METOPROLOL TARTRATE SCH: 50 TABLET, FILM COATED ORAL at 00:30

## 2017-05-14 RX ADMIN — NITROGLYCERIN SCH INCH: 20 OINTMENT TOPICAL at 12:03

## 2017-05-14 RX ADMIN — POTASSIUM CHLORIDE SCH MLS/HR: 14.9 INJECTION, SOLUTION INTRAVENOUS at 17:42

## 2017-05-14 RX ADMIN — NITROGLYCERIN SCH INCH: 20 OINTMENT TOPICAL at 17:40

## 2017-05-14 RX ADMIN — NITROGLYCERIN SCH: 20 OINTMENT TOPICAL at 02:19

## 2017-05-14 RX ADMIN — ASPIRIN 325 MG ORAL TABLET SCH MG: 325 PILL ORAL at 09:57

## 2017-05-14 RX ADMIN — INSULIN LISPRO SCH UNIT: 100 INJECTION, SOLUTION INTRAVENOUS; SUBCUTANEOUS at 21:21

## 2017-05-14 RX ADMIN — INSULIN LISPRO SCH UNIT: 100 INJECTION, SOLUTION INTRAVENOUS; SUBCUTANEOUS at 17:43

## 2017-05-14 RX ADMIN — ATORVASTATIN CALCIUM SCH MG: 80 TABLET, FILM COATED ORAL at 09:57

## 2017-05-14 RX ADMIN — POTASSIUM CHLORIDE SCH: 20 TABLET, EXTENDED RELEASE ORAL at 02:30

## 2017-05-14 RX ADMIN — Medication SCH EACH: at 21:05

## 2017-05-14 RX ADMIN — NITROGLYCERIN SCH: 20 OINTMENT TOPICAL at 06:19

## 2017-05-14 RX ADMIN — METOPROLOL TARTRATE SCH MG: 50 TABLET, FILM COATED ORAL at 21:05

## 2017-05-14 RX ADMIN — SODIUM CHLORIDE SCH MLS/HR: 450 INJECTION, SOLUTION INTRAVENOUS at 20:59

## 2017-05-14 RX ADMIN — INSULIN LISPRO SCH UNIT: 100 INJECTION, SOLUTION INTRAVENOUS; SUBCUTANEOUS at 06:34

## 2017-05-14 RX ADMIN — METOPROLOL TARTRATE SCH MG: 50 TABLET, FILM COATED ORAL at 09:57

## 2017-05-14 NOTE — P.PN
Subjective


Keena is a 66-year-old white female well-known to me from [facility] on the [facility].  She has advanced multiple sclerosis and only use of her right 

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  Staff had reported last week 

that she been getting confused off and on.  A urinalysis and chest x-ray was 

done.  The urinalysis was never taken as they were unable to put in a Beck 

catheter.  He had not been contacted about her until yesterday, when she was 

very confused.  They had reported to me over the past several days of 

significantly elevated sugars.  This is not out of the norm for Keena.  Was a 

brittle diabetic, and frequently has hypo-and hyperglycemia.  She was found to 

have a UTI.  She is here for treatment regarding the confusion.  





5/13 She was found asleep, but is easily arousable. she was confused and unable 

to answer most questions. She does recognize me on entering her room.  and asks 

of my family.





5/14, she is more alert. He was found awake when entering the room. Was 

oriented 2 today plus the holiday, but unsure of the exact day and date. This 

is even an improvement from her normal nursing home mental status. She denies 

any significant complaints at this time. She denies any chest pains, pressures, 

shortness of breath, nausea, vomiting, blood in her stool, or black stool. She 

has a Beck catheter to gravity











Objective





- Vital Signs


Vital signs: 


 Vital Signs











Temp  97.1 F L  05/14/17 04:00


 


Pulse  82   05/14/17 08:00


 


Resp  16   05/14/17 08:00


 


BP  134/69   05/14/17 08:00


 


Pulse Ox  98   05/14/17 08:00








 Intake & Output











 05/13/17 05/14/17 05/14/17





 18:59 06:59 18:59


 


Intake Total 463.904  900


 


Output Total 1000 800 


 


Balance -536.096 -800 900


 


Weight 72.575 kg 67.5 kg 67.5 kg


 


Intake:   


 


  Intake, IV Titration 463.904  900





  Amount   


 


    Dextrose 5%-0.45% NaCl 1,   900





    000 ml @ 75 mls/hr IV .   





    [id number] ANUJA Rx#:[id number]   


 


    Heparin Sodium,Porcine/ 113.904  





    D5w Pmx 25,000 unit In   





    Dextrose/Water 1 500ml.   





    bag @ 12 UNITS/KG/HR 17.   





    41 mls/hr IV .Q24H ANUJA Rx   





    #:[id number]   


 


    Potassium Chloride 10 meq 100  





    Lidocaine 2% Inj 10 mg   





    In Sodium Chloride 0.9%   





    100 ml @ 100 mls/hr IV   





    Q1HR ANUJA Rx#:[id number]   


 


    Sodium Chloride 0.9% 1, 250  





    000 ml @ 100 mls/hr IV .   





    Q10H ANUJA Rx#:[id number]   


 


Output:   


 


  Urine 1000 800 


 


Other:   


 


  Voiding Method Indwelling Catheter Indwelling Catheter Indwelling Catheter


 


  # Bowel Movements  1 














- Exam





GENERAL: Awake alert, thin and in no acute distress.


HEAD: Atraumatic, normocephalic.


NECK: Normal range of motion, supple without lymphadenopathy or JVD, no 

thyromegaly


LUNGS: Breath sounds clear to auscultation bilaterally and equal.  No wheezes 

rales or rhonchi.


HEART: Regular rate and rhythm without murmurs, rubs or gallops.S1S2 Normal


ABDOMEN: Soft, , normoactive bowel sounds.  No guarding, no rebound.  There 

distended due to chronic fecal retention area and there is a Ebck in place


EXTREMITIES: Atrophy to all with only use of her right upper extremity.


NEUROLOGICAL: Cranial nerves II through XII grossly intact.  Normal speech, 

nonambulatory, wheelchair bound due to multiple sclerosis leaving her 

functionally quadriplegic, AA02.5


PSYCH: Normal mood, normal affect.


SKIN: Warm, Dry, normal turgor, no rashes or lesions noted.











- Labs


CBC & Chem 7: 


 05/14/17 06:05





 05/14/17 06:05


Labs: 


 Abnormal Lab Results - Last 24 Hours (Table)











  05/13/17 05/13/17 05/13/17 Range/Units





  13:24 13:24 16:27 


 


RBC     (3.80-5.40)  m/uL


 


Hgb     (11.4-16.0)  gm/dL


 


Hct     (34.0-46.0)  %


 


MCHC     (31.0-37.0)  g/dL


 


APTT  36.7 H    (22.0-30.0)  sec


 


Potassium     (3.5-5.1)  mmol/L


 


Chloride     ()  mmol/L


 


Glucose     (74-99)  mg/dL


 


POC Glucose (mg/dL)    266 H  (75-99)  mg/dL


 


Hemoglobin A1c   8.9 H   (4.2-6.1)  %


 


Calcium     (8.4-10.2)  mg/dL














  05/13/17 05/13/17 05/14/17 Range/Units





  21:10 21:12 00:24 


 


RBC     (3.80-5.40)  m/uL


 


Hgb     (11.4-16.0)  gm/dL


 


Hct     (34.0-46.0)  %


 


MCHC     (31.0-37.0)  g/dL


 


APTT   48.6 H   (22.0-30.0)  sec


 


Potassium    3.1 L  (3.5-5.1)  mmol/L


 


Chloride     ()  mmol/L


 


Glucose     (74-99)  mg/dL


 


POC Glucose (mg/dL)  192 H    (75-99)  mg/dL


 


Hemoglobin A1c     (4.2-6.1)  %


 


Calcium     (8.4-10.2)  mg/dL














  05/14/17 05/14/17 05/14/17 Range/Units





  06:05 06:05 06:05 


 


RBC  3.70 L    (3.80-5.40)  m/uL


 


Hgb  9.8 L    (11.4-16.0)  gm/dL


 


Hct  31.7 L    (34.0-46.0)  %


 


MCHC  30.9 L    (31.0-37.0)  g/dL


 


APTT    55.9 H  (22.0-30.0)  sec


 


Potassium     (3.5-5.1)  mmol/L


 


Chloride   108 H   ()  mmol/L


 


Glucose   240 H   (74-99)  mg/dL


 


POC Glucose (mg/dL)     (75-99)  mg/dL


 


Hemoglobin A1c     (4.2-6.1)  %


 


Calcium   7.9 L   (8.4-10.2)  mg/dL














  05/14/17 05/14/17 05/14/17 Range/Units





  06:11 11:37 11:39 


 


RBC     (3.80-5.40)  m/uL


 


Hgb     (11.4-16.0)  gm/dL


 


Hct     (34.0-46.0)  %


 


MCHC     (31.0-37.0)  g/dL


 


APTT     (22.0-30.0)  sec


 


Potassium     (3.5-5.1)  mmol/L


 


Chloride     ()  mmol/L


 


Glucose     (74-99)  mg/dL


 


POC Glucose (mg/dL)  250 H  444 H  411 H  (75-99)  mg/dL


 


Hemoglobin A1c     (4.2-6.1)  %


 


Calcium     (8.4-10.2)  mg/dL








 Microbiology - Last 24 Hours (Table)











 05/12/17 16:50 Urine Culture - Preliminary





 Urine,Catheterized    Gram Neg Bacilli














Assessment and Plan


Plan: 


Metabolic encephalopathy due to UTI and multiple sclerosis: She is receiving IV 

fluids.  Continue antibiotics of Rocephin. She is much improved


Sepsis: She had a leukocytosis, fever and confusion.  We'll monitor.  We'll 

consult neurology


Abnormal troponins: Cardiology consultation, with the recommendations. We'll 

have heparin drip discontinued if okay with them


Hypernatremia, resolved: Due to dehydration, will continue D5 half-normal 

saline.


Hypokalemia him a resolved: We will monitor


Chronic recurrent UTI, on suppression therapy well and I'll restart her 

methenamine.


Chronic low back pain: We'll decrease her pain medications and hold her Marinol 

this time.


Moderate malnutrition: I'll hold her Marinol this time due to the side effects 

mentation issues.


Multiple sclerosis: This is possibly an exacerbation brought on by the recent 

UTI and sepsis. We'll await neurology recommendations.





sHe will be reevaluated in the next 24 hours.

## 2017-05-15 VITALS — DIASTOLIC BLOOD PRESSURE: 100 MMHG | SYSTOLIC BLOOD PRESSURE: 150 MMHG | HEART RATE: 78 BPM

## 2017-05-15 VITALS — TEMPERATURE: 97.3 F

## 2017-05-15 VITALS — RESPIRATION RATE: 16 BRPM

## 2017-05-15 LAB
ANION GAP SERPL CALC-SCNC: 8 MMOL/L
BASOPHILS # BLD AUTO: 0 K/UL (ref 0–0.2)
BASOPHILS NFR BLD AUTO: 0 %
BUN SERPL-SCNC: 11 MG/DL (ref 7–17)
CALCIUM SPEC-MCNC: 8.5 MG/DL (ref 8.4–10.2)
CH: 26.5
CHCM: 30.8
CHLORIDE SERPL-SCNC: 105 MMOL/L (ref 98–107)
CO2 SERPL-SCNC: 22 MMOL/L (ref 22–30)
EOSINOPHIL # BLD AUTO: 0.1 K/UL (ref 0–0.7)
EOSINOPHIL NFR BLD AUTO: 1 %
ERYTHROCYTE [DISTWIDTH] IN BLOOD BY AUTOMATED COUNT: 3.58 M/UL (ref 3.8–5.4)
ERYTHROCYTE [DISTWIDTH] IN BLOOD: 14.2 % (ref 11.5–15.5)
GLUCOSE BLD-MCNC: 208 MG/DL (ref 75–99)
GLUCOSE BLD-MCNC: 341 MG/DL (ref 75–99)
GLUCOSE BLD-MCNC: 388 MG/DL (ref 75–99)
GLUCOSE SERPL-MCNC: 521 MG/DL (ref 74–99)
HCT VFR BLD AUTO: 30.9 % (ref 34–46)
HDW: 2.39
HGB BLD-MCNC: 9.6 GM/DL (ref 11.4–16)
LUC NFR BLD AUTO: 2 %
LYMPHOCYTES # SPEC AUTO: 0.9 K/UL (ref 1–4.8)
LYMPHOCYTES NFR SPEC AUTO: 25 %
MAGNESIUM SPEC-SCNC: 1.7 MG/DL (ref 1.6–2.3)
MCH RBC QN AUTO: 26.7 PG (ref 25–35)
MCHC RBC AUTO-ENTMCNC: 31 G/DL (ref 31–37)
MCV RBC AUTO: 86.3 FL (ref 80–100)
MONOCYTES # BLD AUTO: 0.2 K/UL (ref 0–1)
MONOCYTES NFR BLD AUTO: 5 %
NEUTROPHILS # BLD AUTO: 2.5 K/UL (ref 1.3–7.7)
NEUTROPHILS NFR BLD AUTO: 67 %
NON-AFRICAN AMERICAN GFR(MDRD): >60
POTASSIUM SERPL-SCNC: 3.5 MMOL/L (ref 3.5–5.1)
SODIUM SERPL-SCNC: 135 MMOL/L (ref 137–145)
WBC # BLD AUTO: 0.07 10*3/UL
WBC # BLD AUTO: 3.7 K/UL (ref 3.8–10.6)
WBC (PEROX): 3.88

## 2017-05-15 RX ADMIN — Medication SCH EACH: at 08:39

## 2017-05-15 RX ADMIN — POTASSIUM CHLORIDE SCH MLS/HR: 14.9 INJECTION, SOLUTION INTRAVENOUS at 06:21

## 2017-05-15 RX ADMIN — ASPIRIN 325 MG ORAL TABLET SCH MG: 325 PILL ORAL at 08:40

## 2017-05-15 RX ADMIN — ATORVASTATIN CALCIUM SCH MG: 80 TABLET, FILM COATED ORAL at 08:40

## 2017-05-15 RX ADMIN — INSULIN LISPRO SCH UNIT: 100 INJECTION, SOLUTION INTRAVENOUS; SUBCUTANEOUS at 06:29

## 2017-05-15 RX ADMIN — METOPROLOL TARTRATE SCH MG: 50 TABLET, FILM COATED ORAL at 08:40

## 2017-05-15 RX ADMIN — NITROGLYCERIN SCH: 20 OINTMENT TOPICAL at 00:01

## 2017-05-15 RX ADMIN — NITROGLYCERIN SCH INCH: 20 OINTMENT TOPICAL at 12:57

## 2017-05-15 RX ADMIN — NITROGLYCERIN SCH: 20 OINTMENT TOPICAL at 05:49

## 2017-05-15 RX ADMIN — INSULIN LISPRO SCH UNIT: 100 INJECTION, SOLUTION INTRAVENOUS; SUBCUTANEOUS at 12:47

## 2017-05-15 NOTE — P.PN
Subjective


Principal diagnosis: 





UTI





This 58-year-old  female admitted to the hospital with UTI.  She does 

also have history of dementia.  She was noted to have mild troponin elevation 

and for this reason a cardiology consultation was initially requested.  She 

continues today to be on IV heparin.  Hemodynamically remaining stable.  

Troponin abnormality not consistent with acute coronary syndrome.  Cardiology's 

perspective, we'll discontinue the patient's IV heparin, and follow her now on 

an as-needed basis only.  Please don't hesitate to call with any questions.





Objective





- Vital Signs


Vital signs: 


 Vital Signs











Temp  97.3 F L  05/15/17 08:00


 


Pulse  98   05/15/17 08:00


 


Resp  16   05/15/17 08:00


 


BP  133/60   05/15/17 08:00


 


Pulse Ox  99   05/15/17 08:00








 Intake & Output











 05/14/17 05/15/17 05/15/17





 18:59 06:59 18:59


 


Intake Total 1122 837 779.401


 


Output Total 1100 1300 


 


Balance 22 -463 779.401


 


Weight 67.5 kg 60.5 kg 


 


Intake:   


 


  IV  837 


 


    Dextrose 5%-0.45% NaCl 1,  675 





    000 ml @ 75 mls/hr IV .   





    [id number] ANUJA Rx#:[id number]   


 


    Heparin Sodium,Porcine/  162 





    D5w Pmx 25,000 unit In   





    Dextrose/Water 1 500ml.   





    bag @ 12 UNITS/KG/HR 17.   





    41 mls/hr IV .Q24H ANUJA Rx   





    #:[id number]   


 


  Intake, IV Titration 900  197.401





  Amount   


 


    Dextrose 5%-0.45% NaCl 1, 900  





    000 ml @ 75 mls/hr IV .   





    [id number] ANUJA Rx#:[id number]   


 


    Heparin Sodium,Porcine/   197.401





    D5w Pmx 25,000 unit In   





    Dextrose/Water 1 500ml.   





    bag @ 12 UNITS/KG/HR 17.   





    41 mls/hr IV .Q24H ANUJA Rx   





    #:[id number]   


 


  Oral 222  582


 


Output:   


 


  Urine 1100 1300 


 


Other:   


 


  Voiding Method Indwelling Catheter Indwelling Catheter Indwelling Catheter


 


  # Voids  3 














- Exam





PHYSICAL EXAMINATION: 





HEENT: Head is atraumatic, normocephalic.  Pupils equal, round.  Neck is 

supple.  There is no elevated jugular venous pressure.





HEART EXAMINATION: S1 and S2 systolic murmur is heard.





CHEST EXAMINATION: Lungs are clear to auscultation and precussion. No chest 

wall tenderness is noted on palpation or with deep breathing.





ABDOMEN:  Soft, nontender. Bowel sounds are heard. No organomegaly noted.


 


EXTREMITIES: 2+ peripheral pulses with no evidence of peripheral edema and no 

calf tenderness noted.





NEUROLOGIC patient is awake, alert and oriented -3.


 


.


 








- Labs


CBC & Chem 7: 


 05/15/17 06:30





 05/15/17 06:30


Labs: 


 Abnormal Lab Results - Last 24 Hours (Table)











  05/14/17 05/14/17 05/15/17 Range/Units





  17:04 21:20 05:57 


 


WBC     (3.8-10.6)  k/uL


 


RBC     (3.80-5.40)  m/uL


 


Hgb     (11.4-16.0)  gm/dL


 


Hct     (34.0-46.0)  %


 


Lymphocytes #     (1.0-4.8)  k/uL


 


APTT     (22.0-30.0)  sec


 


Sodium     (137-145)  mmol/L


 


Glucose     (74-99)  mg/dL


 


POC Glucose (mg/dL)  251 H  260 H  341 H  (75-99)  mg/dL














  05/15/17 05/15/17 05/15/17 Range/Units





  06:30 06:30 06:30 


 


WBC  3.7 L    (3.8-10.6)  k/uL


 


RBC  3.58 L    (3.80-5.40)  m/uL


 


Hgb  9.6 L    (11.4-16.0)  gm/dL


 


Hct  30.9 L    (34.0-46.0)  %


 


Lymphocytes #  0.9 L    (1.0-4.8)  k/uL


 


APTT    101.8 H*  (22.0-30.0)  sec


 


Sodium   135 L   (137-145)  mmol/L


 


Glucose   521 H*   (74-99)  mg/dL


 


POC Glucose (mg/dL)     (75-99)  mg/dL














  05/15/17 Range/Units





  11:28 


 


WBC   (3.8-10.6)  k/uL


 


RBC   (3.80-5.40)  m/uL


 


Hgb   (11.4-16.0)  gm/dL


 


Hct   (34.0-46.0)  %


 


Lymphocytes #   (1.0-4.8)  k/uL


 


APTT   (22.0-30.0)  sec


 


Sodium   (137-145)  mmol/L


 


Glucose   (74-99)  mg/dL


 


POC Glucose (mg/dL)  388 H  (75-99)  mg/dL








 Microbiology - Last 24 Hours (Table)











 05/12/17 16:50 Urine Culture - Final





 Urine,Catheterized    Proteus mirabilis














Assessment and Plan


(1) Altered mental status


Status: Acute   





(2) Elevated troponin


Status: Acute   





(3) Multiple sclerosis, primary chronic progressive


Status: Chronic   





(4) Acute renal failure


Status: Acute   





(5) Metabolic encephalopathy


Status: Acute   





(6) Urinary tract infection


Status: Acute   


Plan: 


From cardiology's perspective, we'll follow this patient with you now on an as-

needed basis only, please dont hesitate to call with any questions.








DNP note has been reviewed, I agree with a documented findings and plan of 

care.  Patient was seen and examined.

## 2017-05-15 NOTE — P.DS
Providers


Date of admission: 


05/12/17 16:38





Expected date of discharge: 05/15/17


Attending physician: 


Reid Stromberg





Consults: 


Cardiology





Primary care physician: 


Reid Stromberg





[facility] Course: 


Keena is a 66-year-old white female well-known to Dr. Stromberg from [facility] on 

the [facility].  Medical history significant for advanced multiple 

sclerosis and only use of her right hand, otherwise she is a functional 

quadraplegic, and brittle diabetic.  Patient was admitted through the emergency 

department with increased confusion.  Patient was found to have evidence of 

urinary tract infection with urine culture positive for Proteus mirabilis.  

Patient did have elevated troponins upon admission but pattern was not 

consistent with acute coronary injury per cardiology.  Patient improved with 

antibiotics and IV hydration.  Patient was deemed stable for [place] back [place] 

[facility] [place] close follow-up in the outpatient setting.





Discharge diagnoses:





Metabolic encephalopathy due to UTI and multiple sclerosis, resolved.


Sepsis suspect secondary to urinary tract infection.


Chronic recurrent UTI


Chronic low back pain


Moderate malnutrition


Multiple sclerosis


Anemia of chronic disease.


Insulin-dependent diabetes mellitus, with hyperglycemia present on admission.





The above impression and plan have been discussed and directed by Dr. Alvarado. 

Blaise TORRES acting as scribe for Dr. Alvarado.


Pertinent Studies: 


Chest x-ray; EKG; chest CTA





Patient Condition at Discharge: Good





Plan - Discharge Summary


New Discharge Prescriptions: 


Cefuroxime Axetil [Ceftin] 500 mg PO BID #14 tab


Diazepam [Valium] 5 mg PO TID PRN #21 tab


 PRN Reason: Spasms


Dronabinol 10 mg PO BID@0900,2100 #14 capsule


fentaNYL 100MCG/HR PATCH [Duragesic 100MCG/HR] 1 patch TRANSDERM Q72H #2 patch


traMADol HCL [Ultram ER] 300 mg PO DAILY@0600 #7 tab.er.24h


Discharge Medication List





Acetaminophen [Tylenol] 650 mg PO Q4H PRN 01/31/15 [History]


Amitriptyline HCl 25 mg PO HS@2100 01/31/15 [History]


Docusate Sodium [Dulcolax Stool Softener] 200 mg PO DAILY@0900 01/31/15 [History

]


Ergocalciferol [Vitamin D2 (DRISDOL)] 50,000 unit PO SA 01/31/15 [History]


Escitalopram [Lexapro] 20 mg PO DAILY@0900 01/31/15 [History]


Insulin Glargine [Lantus] 30 unit SQ DAILY@0900 01/31/15 [History]


Levothyroxine Sodium [Synthroid] 75 mcg PO DAILY@0600 01/31/15 [History]


Lisinopril [Prinivil] 20 mg PO DAILY@0900 01/31/15 [History]


Melatonin 3 mg PO HS PRN 01/31/15 [History]


Methenamine Hippurate 1 gm PO BID@0900,2100 01/31/15 [History]


Omeprazole [PriLOSEC] 20 mg PO DAILY@0600 01/31/15 [History]


Polyethylene Glycol 3350 [Miralax] 17 gm PO BID@0900,2100 01/31/15 [History]


Sennosides [Senokot] 17.2 mg PO DAILY@0900 01/31/15 [History]


Artificial Tears-Hypromellose [Artificial Tear Drops] 1 drop BOTH EYES BID PRN 

09/20/16 [History]


Bisacodyl 10 mg RECTAL DAILY PRN 09/20/16 [History]


Calcium Carbonate [Calcium] 600 mg PO BID@0900,2100 09/20/16 [History]


Fenofibrate Nanocrystallized [Tricor] 160 mg PO HS@2100 09/20/16 [History]


Glucagen Hypokit Solution  1 mg IM Q10M PRN 09/20/16 [History]


Inzo Barrier Cream 1 applic TOPICAL BID 09/20/16 [History]


Magnesium Citrate [Citrate of Magnesia] 300 ml PO Q72H PRN 09/20/16 [History]


Naloxegol Oxalate [Movantik] 25 mg PO HS@2100 09/20/16 [History]


Antifungal Powder 1 applic TOPICAL DAILY 05/12/17 [History]


Insulin Aspart [NovoLOG Flexpen] See Protocol SQ ACHS 05/12/17 [History]


amLODIPine [Norvasc] 5 mg PO DAILY@0900 05/12/17 [History]


Aspirin 81 mg PO DAILY  chew 05/15/17 [Rx]


Atorvastatin [Lipitor] 80 mg PO DAILY  tab 05/15/17 [Rx]


Cefuroxime Axetil [Ceftin] 500 mg PO BID #14 tab 05/15/17 [Rx]


Diazepam [Valium] 5 mg PO TID PRN #21 tab 05/15/17 [Rx]


Dronabinol 10 mg PO BID@0900,2100 #14 capsule 05/15/17 [Rx]


Metoprolol Tartrate [Lopressor] 50 mg PO BID  tab 05/15/17 [Rx]


Nitroglycerin Sl Tabs [Nitrostat] 0.4 mg SUBLINGUAL Q5M PRN #0 tab 05/15/17 [Rx]


fentaNYL 100MCG/HR PATCH [Duragesic 100MCG/HR] 1 patch TRANSDERM Q72H #2 patch 

05/15/17 [Rx]


traMADol HCL [Ultram ER] 300 mg PO DAILY@0600 #7 tab.er.24h 05/15/17 [Rx]








Follow up Appointment(s)/Referral(s): 


Stromberg,Reid, MD [Primary Care Provider] - 1-2 days


Mathew Knowles MD [STAFF PHYSICIAN] - 1 Week (weakness with history of MS)


Discharge Disposition: TRANSFER TO SNF/ECF

## 2017-10-23 ENCOUNTER — HOSPITAL ENCOUNTER (OUTPATIENT)
Dept: HOSPITAL 47 - RADMRIMAIN | Age: 67
Discharge: HOME | End: 2017-10-23
Payer: MEDICARE

## 2017-10-23 DIAGNOSIS — M54.2: ICD-10-CM

## 2017-10-23 DIAGNOSIS — Z01.818: Primary | ICD-10-CM

## 2017-10-23 DIAGNOSIS — G35: ICD-10-CM

## 2017-10-23 LAB — NON-AFRICAN AMERICAN GFR(MDRD): 55

## 2017-10-23 PROCEDURE — 82565 ASSAY OF CREATININE: CPT

## 2018-12-28 ENCOUNTER — HOSPITAL ENCOUNTER (OUTPATIENT)
Dept: HOSPITAL 47 - RADMRIMAIN | Age: 68
Discharge: HOME | End: 2018-12-28
Payer: MEDICARE

## 2018-12-28 DIAGNOSIS — G35: ICD-10-CM

## 2018-12-28 DIAGNOSIS — R90.89: ICD-10-CM

## 2018-12-28 DIAGNOSIS — G31.9: Primary | ICD-10-CM

## 2018-12-28 PROCEDURE — 70553 MRI BRAIN STEM W/O & W/DYE: CPT

## 2018-12-28 NOTE — MR
EXAMINATION TYPE: MR brain wo/w con

 

DATE OF EXAM: 12/28/2018

 

COMPARISON: Prior MRI brain April 12, 2016.

 

HISTORY: Multiple sclerosis

 

TECHNIQUE: 

Multiplanar, multisequence images of the brain and brainstem is performed without and with IV contras
t, utilizing 6 mL intravenous Gadavist gadolinium contrast is administered intravenously.  Demyelinat
ing disease protocol with additional Sagittal Flair sequence performed.

 

FINDINGS: Exam noted suboptimal because patient could not lie flat and kept moving.

T2 Lesions Present :  Yes

Approximate Number of Lesions: Difficult to accurately count due to confluent periventricular appeara
nce

Locations Identified : Predominantly periventricular and deep

Size of Reference Lesion(s): 

1. 5 x 4 x 7 mm ovoid lesion left frontal periventricular axial image 23 and sagittal image 11 stable


Enhancing Lesion(s) Present: No

T1 Hypointense Lesion(s) Present: Yes

Change from Prior: Stable

 

Diffusion weighted images could not be performed due to patient motion.  There is no worrisome extra-
axial fluid collection. There is ventricular and sulcal prominence redemonstrated.

 

Midline structures demonstrate normal morphology.  The craniocervical junction appears within normal 
limits.  Post contrast images demonstrate no abnormal enhancement. The dural venous sinuses appear pa
tent.  The visualized sinuses are clear and the globes are intact.

 

IMPRESSION: Mild to moderate diffuse cerebral atrophy and nonspecific white matter changes redemonstr
ated. No enhancing lesions are seen. Suboptimal study without significant change from 2016 MRI.

## 2020-01-27 ENCOUNTER — HOSPITAL ENCOUNTER (INPATIENT)
Dept: HOSPITAL 47 - EC | Age: 70
LOS: 3 days | Discharge: NURSING, REHAB OR LONG-TERM CARE | DRG: 871 | End: 2020-01-30
Attending: FAMILY MEDICINE | Admitting: FAMILY MEDICINE
Payer: MEDICARE

## 2020-01-27 DIAGNOSIS — Z87.891: ICD-10-CM

## 2020-01-27 DIAGNOSIS — G93.41: ICD-10-CM

## 2020-01-27 DIAGNOSIS — G35: ICD-10-CM

## 2020-01-27 DIAGNOSIS — A41.9: Primary | ICD-10-CM

## 2020-01-27 DIAGNOSIS — Z90.49: ICD-10-CM

## 2020-01-27 DIAGNOSIS — Z79.890: ICD-10-CM

## 2020-01-27 DIAGNOSIS — J44.9: ICD-10-CM

## 2020-01-27 DIAGNOSIS — G89.4: ICD-10-CM

## 2020-01-27 DIAGNOSIS — Z79.4: ICD-10-CM

## 2020-01-27 DIAGNOSIS — Z86.19: ICD-10-CM

## 2020-01-27 DIAGNOSIS — E03.9: ICD-10-CM

## 2020-01-27 DIAGNOSIS — Z86.73: ICD-10-CM

## 2020-01-27 DIAGNOSIS — R41.3: ICD-10-CM

## 2020-01-27 DIAGNOSIS — K59.09: ICD-10-CM

## 2020-01-27 DIAGNOSIS — Z79.899: ICD-10-CM

## 2020-01-27 DIAGNOSIS — E11.65: ICD-10-CM

## 2020-01-27 DIAGNOSIS — Z79.82: ICD-10-CM

## 2020-01-27 DIAGNOSIS — E11.649: ICD-10-CM

## 2020-01-27 DIAGNOSIS — E86.0: ICD-10-CM

## 2020-01-27 DIAGNOSIS — I10: ICD-10-CM

## 2020-01-27 DIAGNOSIS — N39.0: ICD-10-CM

## 2020-01-27 DIAGNOSIS — Z87.440: ICD-10-CM

## 2020-01-27 DIAGNOSIS — J98.11: ICD-10-CM

## 2020-01-27 DIAGNOSIS — F32.9: ICD-10-CM

## 2020-01-27 LAB
ALBUMIN SERPL-MCNC: 3.3 G/DL (ref 3.5–5)
ALP SERPL-CCNC: 39 U/L (ref 38–126)
ALT SERPL-CCNC: 29 U/L (ref 4–34)
APTT BLD: 24.3 SEC (ref 22–30)
AST SERPL-CCNC: 89 U/L (ref 14–36)
BASOPHILS # BLD AUTO: 0.1 K/UL (ref 0–0.2)
BASOPHILS NFR BLD AUTO: 1 %
BUN SERPL-SCNC: 42 MG/DL (ref 7–17)
CALCIUM SPEC-MCNC: 8.9 MG/DL (ref 8.4–10.2)
CHLORIDE SERPL-SCNC: 107 MMOL/L (ref 98–107)
CO2 SERPL-SCNC: 25 MMOL/L (ref 22–30)
EOSINOPHIL # BLD AUTO: 0.1 K/UL (ref 0–0.7)
EOSINOPHIL NFR BLD AUTO: 1 %
ERYTHROCYTE [DISTWIDTH] IN BLOOD BY AUTOMATED COUNT: 3.29 M/UL (ref 3.8–5.4)
ERYTHROCYTE [DISTWIDTH] IN BLOOD: 14.8 % (ref 11.5–15.5)
GLUCOSE SERPL-MCNC: 98 MG/DL (ref 74–99)
HCT VFR BLD AUTO: 29.7 % (ref 34–46)
HGB BLD-MCNC: 9.7 GM/DL (ref 11.4–16)
INR PPP: 1.2 (ref ?–1.2)
LYMPHOCYTES # SPEC AUTO: 0.6 K/UL (ref 1–4.8)
LYMPHOCYTES NFR SPEC AUTO: 8 %
MCH RBC QN AUTO: 29.5 PG (ref 25–35)
MCHC RBC AUTO-ENTMCNC: 32.7 G/DL (ref 31–37)
MCV RBC AUTO: 90.2 FL (ref 80–100)
MONOCYTES # BLD AUTO: 0.2 K/UL (ref 0–1)
MONOCYTES NFR BLD AUTO: 3 %
NEUTROPHILS # BLD AUTO: 6.2 K/UL (ref 1.3–7.7)
NEUTROPHILS NFR BLD AUTO: 88 %
PLATELET # BLD AUTO: 256 K/UL (ref 150–450)
PROT SERPL-MCNC: 6.9 G/DL (ref 6.3–8.2)
PT BLD: 12.1 SEC (ref 9–12)
SODIUM SERPL-SCNC: 137 MMOL/L (ref 137–145)
WBC # BLD AUTO: 7.1 K/UL (ref 3.8–10.6)

## 2020-01-27 PROCEDURE — 87040 BLOOD CULTURE FOR BACTERIA: CPT

## 2020-01-27 PROCEDURE — 83605 ASSAY OF LACTIC ACID: CPT

## 2020-01-27 PROCEDURE — 99285 EMERGENCY DEPT VISIT HI MDM: CPT

## 2020-01-27 PROCEDURE — 93005 ELECTROCARDIOGRAM TRACING: CPT

## 2020-01-27 PROCEDURE — 87077 CULTURE AEROBIC IDENTIFY: CPT

## 2020-01-27 PROCEDURE — 85610 PROTHROMBIN TIME: CPT

## 2020-01-27 PROCEDURE — 36415 COLL VENOUS BLD VENIPUNCTURE: CPT

## 2020-01-27 PROCEDURE — 80048 BASIC METABOLIC PNL TOTAL CA: CPT

## 2020-01-27 PROCEDURE — 85027 COMPLETE CBC AUTOMATED: CPT

## 2020-01-27 PROCEDURE — 87086 URINE CULTURE/COLONY COUNT: CPT

## 2020-01-27 PROCEDURE — 96365 THER/PROPH/DIAG IV INF INIT: CPT

## 2020-01-27 PROCEDURE — 96366 THER/PROPH/DIAG IV INF ADDON: CPT

## 2020-01-27 PROCEDURE — 85025 COMPLETE CBC W/AUTO DIFF WBC: CPT

## 2020-01-27 PROCEDURE — 71046 X-RAY EXAM CHEST 2 VIEWS: CPT

## 2020-01-27 PROCEDURE — 81001 URINALYSIS AUTO W/SCOPE: CPT

## 2020-01-27 PROCEDURE — 70553 MRI BRAIN STEM W/O & W/DYE: CPT

## 2020-01-27 PROCEDURE — 80053 COMPREHEN METABOLIC PANEL: CPT

## 2020-01-27 PROCEDURE — 87502 INFLUENZA DNA AMP PROBE: CPT

## 2020-01-27 PROCEDURE — 85730 THROMBOPLASTIN TIME PARTIAL: CPT

## 2020-01-27 PROCEDURE — 87186 SC STD MICRODIL/AGAR DIL: CPT

## 2020-01-27 PROCEDURE — 83036 HEMOGLOBIN GLYCOSYLATED A1C: CPT

## 2020-01-27 PROCEDURE — 96368 THER/DIAG CONCURRENT INF: CPT

## 2020-01-27 PROCEDURE — 82947 ASSAY GLUCOSE BLOOD QUANT: CPT

## 2020-01-27 PROCEDURE — 71045 X-RAY EXAM CHEST 1 VIEW: CPT

## 2020-01-27 RX ADMIN — CEFAZOLIN SCH MLS/HR: 330 INJECTION, POWDER, FOR SOLUTION INTRAMUSCULAR; INTRAVENOUS at 23:04

## 2020-01-27 NOTE — XR
EXAMINATION TYPE: XR chest 2V

 

DATE OF EXAM: 1/27/2020

 

COMPARISON: NONE

 

HISTORY: Altered mental status. Pain.

 

TECHNIQUE:

 

FINDINGS: There is some coarse linear density in the right lung and also left lung base. There is no 
heart failure. Heart size is normal. There is no pleural effusion.

 

IMPRESSION: There is patchy bilateral atelectasis and fibrotic change that is new compared to old exa
m. No heart failure. Normal heart.

## 2020-01-28 LAB
GLUCOSE BLD-MCNC: 101 MG/DL (ref 75–99)
GLUCOSE BLD-MCNC: 190 MG/DL (ref 75–99)
GLUCOSE BLD-MCNC: 410 MG/DL (ref 75–99)
GLUCOSE BLD-MCNC: 466 MG/DL (ref 75–99)
GLUCOSE BLD-MCNC: 478 MG/DL (ref 75–99)
GLUCOSE BLD-MCNC: 56 MG/DL (ref 75–99)
PH UR: 7.5 [PH] (ref 5–8)
PROT UR QL: (no result)
RBC UR QL: 38 /HPF (ref 0–5)
SP GR UR: 1.02 (ref 1–1.03)
UROBILINOGEN UR QL STRIP: <2 MG/DL (ref ?–2)
WBC # UR AUTO: >182 /HPF (ref 0–5)

## 2020-01-28 RX ADMIN — DRONABINOL SCH MG: 2.5 CAPSULE ORAL at 19:42

## 2020-01-28 RX ADMIN — LEVOTHYROXINE SODIUM SCH MCG: 100 TABLET ORAL at 22:26

## 2020-01-28 RX ADMIN — INSULIN ASPART SCH UNIT: 100 INJECTION, SOLUTION INTRAVENOUS; SUBCUTANEOUS at 22:27

## 2020-01-28 RX ADMIN — CEFAZOLIN SCH MLS/HR: 330 INJECTION, POWDER, FOR SOLUTION INTRAMUSCULAR; INTRAVENOUS at 19:43

## 2020-01-28 RX ADMIN — LEVOTHYROXINE SODIUM SCH: 75 TABLET ORAL at 09:49

## 2020-01-28 RX ADMIN — CEFAZOLIN SCH MLS/HR: 330 INJECTION, POWDER, FOR SOLUTION INTRAMUSCULAR; INTRAVENOUS at 07:36

## 2020-01-28 RX ADMIN — ESCITALOPRAM OXALATE SCH MG: 20 TABLET, FILM COATED ORAL at 10:28

## 2020-01-28 RX ADMIN — DRONABINOL SCH MG: 2.5 CAPSULE ORAL at 10:28

## 2020-01-28 RX ADMIN — ATORVASTATIN CALCIUM SCH MG: 80 TABLET, FILM COATED ORAL at 22:27

## 2020-01-28 RX ADMIN — FENOFIBRATE SCH MG: 160 TABLET ORAL at 22:27

## 2020-01-28 RX ADMIN — LISINOPRIL SCH MG: 20 TABLET ORAL at 22:27

## 2020-01-28 RX ADMIN — ASPIRIN SCH: 325 TABLET ORAL at 22:51

## 2020-01-28 RX ADMIN — PIPERACILLIN AND TAZOBACTAM SCH MLS/HR: 3; .375 INJECTION, POWDER, FOR SOLUTION INTRAVENOUS at 23:18

## 2020-01-28 RX ADMIN — DIAZEPAM PRN MG: 5 TABLET ORAL at 12:48

## 2020-01-28 RX ADMIN — PIPERACILLIN AND TAZOBACTAM SCH MLS/HR: 3; .375 INJECTION, POWDER, FOR SOLUTION INTRAVENOUS at 16:00

## 2020-01-28 RX ADMIN — AMITRIPTYLINE HYDROCHLORIDE SCH MG: 50 TABLET, FILM COATED ORAL at 22:27

## 2020-01-28 RX ADMIN — Medication SCH MG: at 22:27

## 2020-01-28 RX ADMIN — ASPIRIN 81 MG CHEWABLE TABLET SCH MG: 81 TABLET CHEWABLE at 10:27

## 2020-01-28 NOTE — P.CNNES
History of Present Illness


Consult date: 01/28/20


Reason for Consult: MS, progressive


History of Present Illness: 





HISTORY OF PRESENT ILLNESS: 


Thank you for allowing me to evaluate Ms. Keena Villa.





Ms. Villa is a 69 year-old woman with PMhx of multiple sclerosis, COPD, DM, HTN, 

memory impairment, hypothyroidism, depression, presented to [facility] 

with complaints of fever, consulting Neurology for progressive MS. Patient 

states that she may be having MS exacerbation because "she feels smarter, 

everything seems clearer." Patient with no worsening weakness, numbness or 

tingling. Denies any headache, nausea, vomiting dizziness, changes in vision. 





PAST MEDICAL HISTORY:


multiple sclerosis, COPD, DM, HTN, memory impairment, hypothyroidism, depression





PAST SURGICAL HISTORY:


tonsillectomy, adenoidectomy, RLE ORIF





HOME MEDICATIONS: 


Vitamin D, Lexapro, lisinopril, omeprazole, sennosides, movantik, amlodipine, 

aspirin, trulicity, fenofibrate, synthroid, insulin, memantine, melatonin, 

ferrous sulfate, amitriptyline, benadryl, tramadol, atorvastatin





ALLERGIES:


NKDA





SOCIAL HISTORY:


Former smoker.  





REVIEW OF SYSTEMS: 


The 14 systems are reviewed and no additional points are identified compared to 

the review of systems documented history and physical





PHYSICAL EXAMINATION:


VITAL SIGNS: T 97.3  RR 16 /51 O2 sat 97% on RA


GEN.: NAD, pleasant and cooperative


HEENT: NCAT, sclera without icterus


NECK: Supple, no carotid bruit


SKIN AND EXTREMITIES: Warm to touch, no edema





NEURO:


MENTAL STATUS: Patient alert and oriented to self, place, time.  Able to name 

the current president.  Speech fluent, able to name and repeat, following all 

commands readily.  No right and left disorientation, neglect.





CRANIAL NERVES II THROUGH XII: II: Pupils are equal and reactive to light symme

trically. Visual fields are intact to confrontation. III, IV, VI:  No ptosis.  

Extraocular movements full. V: Facial sensation intact from V1-3. VII. No clear 

facial asymmetry.  IX, X: Symmetric palate elevation. XI: Shoulder shrug intact.

XII: Tongue midline without fasciculation or atrophy.





MOTOR: Increased tone in LUE and LLE. Patient with difficulty moving her 

LUE/LLE, which is patient's baseline. No pronator drift or tremor.  Strength is 

5/5 in RUE and RLE.





SENSORY: Intact to light touch in all 4 extremities.  





REFLEXES: 2+ in RUE/RLE.  Toes are mute.  





COORDINATION:  Finger to nose intact with RUE.  No dysmetria.





GAIT: deferred





DIAGNOSTIC TESTING:





LABORATORY:


WBC 7.1 Hgb 9.7 Platelet 256 Na 137 Cl 107 CO2 25 BUN 42 Cr 0.86 glucose 98 AST 

89 ALT 29 AlkPhos 39 urinalysis positive leukesterase, WBC





IMAGING:


MRI brain w/ and w/o contrast 12/28/18:


Mild to moderate diffuse cerebral atrophy and nonspecific white matter changes 

redemonstrate. No enhancing lesions. Stable features of multiple sclerosis. 

Periventricular lesions noted. No significant change from MRI obtained in 2016.





ASSESSMENT:


69 year-old woman with PMhx of multiple sclerosis, COPD, DM, HTN, memory 

impairment, hypothyroidism, depression, presented to [facility] with 

complaints of fever, consulting Neurology for progressive MS. Patient with no 

clear MS exacerbation symptoms. Patient states that she does not ever want to be

started on disease modifying medications. Patient is okay with steroids if she 

needs it. 





RECOMMENDATIONS:


1. Will obtain MRI brain w/ and w/o contrast


2. If patient with active lesions, will treat with steroids. 


3. Neurology will continue to follow














Past Medical History


Past Medical History: Chest Pain / Angina, COPD, CVA/TIA, Diabetes Mellitus, 

Hypertension, Memory Impairment, Neurologic Disorder, Thyroid Disorder


Additional Past Medical History / Comment(s): Current UTI, on suppression 

treatment, chronic constipation, abdominal chronic pain.


History of Any Multi-Drug Resistant Organisms: ESBL


Date of last positivie culture/infection: 11/11/19


MDRO Source:: ESBL URINE


Past Surgical History: Adenoidectomy, Tonsillectomy


Additional Past Surgical History / Comment(s): right leg ORIF, multiple scopes


Past Psychological History: Depression


Additional Psychological History / Comment(s): resident [place], kiel of ESBL


Smoking Status: Former smoker


Past Alcohol Use History: None Reported


Past Drug Use History: None Reported





- Past Family History


  ** Mother


History Unknown: Yes





  ** Father


History Unknown: Yes





Medications and Allergies


                                Home Medications











 Medication  Instructions  Recorded  Confirmed  Type


 


Docusate Sodium [Dulcolax Stool 200 mg PO DAILY@1200 01/31/15 01/28/20 History





Softener]    


 


Ergocalciferol [Vitamin D2 50,000 unit PO SA 01/31/15 01/28/20 History





(DRISDOL)]    


 


Escitalopram [Lexapro] 20 mg PO DAILY@0900 01/31/15 01/28/20 History


 


Lisinopril [Prinivil] 20 mg PO HS@2100 01/31/15 01/28/20 History


 


Omeprazole [PriLOSEC] 20 mg PO DAILY@0900 01/31/15 01/28/20 History


 


Sennosides [Senokot] 17.2 mg PO DAILY@1200 01/31/15 01/28/20 History


 


Naloxegol Oxalate [Movantik] 25 mg PO HS@2100 09/20/16 01/28/20 History


 


amLODIPine [Norvasc] 5 mg PO DAILY@1200 05/12/17 01/28/20 History


 


Aspirin 81 mg PO DAILY  chew 05/15/17 01/28/20 Rx


 


Amitriptyline HCl [Elavil] 50 mg PO HS@2100 01/28/20 01/28/20 History


 


Atorvastatin [Lipitor] 80 mg PO HS@2100 01/28/20 01/28/20 History


 


Dulaglutide [Trulicity] 0.75 mg SQ MO 01/28/20 01/28/20 History


 


Fenofibrate 160 mg PO HS@2100 01/28/20 01/28/20 History


 


Ferrous Sulfate [Feosol] 325 mg PO DAILY@1200 01/28/20 01/28/20 History


 


INSULIN ASPART (NovoLOG) [NovoLOG See Protocol SQ HS 01/28/20 01/28/20 History





(formulary)]    


 


Insulin Glargine,Hum.rec.anlog 20 unit SQ BID@0800,2100 01/28/20 01/28/20 

History





[Basaglar Kwikpen U-100]    


 


Levothyroxine Sodium [Synthroid] 100 mcg PO HS@2100 01/28/20 01/28/20 History


 


Melatonin 10 mg PO HS@2100 01/28/20 01/28/20 History


 


Memantine [Namenda] 5 mg PO DAILY@0900 01/28/20 01/28/20 History


 


diphenhydrAMINE [Benadryl] 25 mg PO HS@2100 01/28/20 01/28/20 History


 


traMADol HCL [Ultram ER] 300 mg PO DAILY@0900 01/28/20 01/28/20 History








                                    Allergies











Allergy/AdvReac Type Severity Reaction Status Date / Time


 


No Known Allergies Allergy   Verified 01/28/20 09:25














Physical Examination





- Vital Signs


Vital Signs: 


                                   Vital Signs











  Temp Pulse Pulse Resp BP BP Pulse Ox


 


 01/28/20 12:07  97.3 F L   101 H  16   101/51  97


 


 01/28/20 07:00  97.0 F L   74  16   115/54  92 L


 


 01/27/20 23:07   76   18  95/59   95


 


 01/27/20 21:58  100.4 F H  78   78 H  110/55   92 L








                                Intake and Output











 01/28/20 01/28/20 01/28/20





 06:59 14:59 22:59


 


Intake Total  500 


 


Balance  500 


 


Intake:   


 


  Oral  500 


 


Other:   


 


  # Voids  1 


 


  Weight  65.317 kg 














Results





- Laboratory Findings


CBC and BMP: 


                                 01/27/20 20:16





                                 01/27/20 22:23


Abnormal Lab Findings: 


                                  Abnormal Labs











  01/27/20 01/27/20 01/27/20





  20:16 20:16 22:23


 


RBC  3.29 L  


 


Hgb  9.7 L  


 


Hct  29.7 L  


 


Lymphocytes #  0.6 L  


 


PT   12.1 H 


 


INR   1.2 H 


 


BUN    42 H


 


POC Glucose (mg/dL)   


 


Total Bilirubin    1.6 H


 


AST    89 H


 


Albumin    3.3 L


 


Urine Appearance   


 


Urine Protein   


 


Urine Blood   


 


Ur Leukocyte Esterase   


 


Urine RBC   


 


Urine WBC   


 


Urine WBC Clumps   


 


Urine Bacteria   


 


Urine Mucus   














  01/28/20 01/28/20 01/28/20





  01:40 08:53 09:14


 


RBC   


 


Hgb   


 


Hct   


 


Lymphocytes #   


 


PT   


 


INR   


 


BUN   


 


POC Glucose (mg/dL)   56 L  101 H


 


Total Bilirubin   


 


AST   


 


Albumin   


 


Urine Appearance  Cloudy H  


 


Urine Protein  1+ H  


 


Urine Blood  Moderate H  


 


Ur Leukocyte Esterase  Large H  


 


Urine RBC  38 H  


 


Urine WBC  >182 H  


 


Urine WBC Clumps  Many H  


 


Urine Bacteria  Occasional H  


 


Urine Mucus  Rare H  














  01/28/20





  11:24


 


RBC 


 


Hgb 


 


Hct 


 


Lymphocytes # 


 


PT 


 


INR 


 


BUN 


 


POC Glucose (mg/dL)  190 H


 


Total Bilirubin 


 


AST 


 


Albumin 


 


Urine Appearance 


 


Urine Protein 


 


Urine Blood 


 


Ur Leukocyte Esterase 


 


Urine RBC 


 


Urine WBC 


 


Urine WBC Clumps 


 


Urine Bacteria 


 


Urine Mucus

## 2020-01-28 NOTE — HP
HISTORY AND PHYSICAL



This is a 69-year-old white female complaining of fever, chills, altered mental status,

history of progressive multiple sclerosis with 3 to 4 extremity weakness.  Complains of

fever and altered mental status.  Found to have a large amount of UTI with urosepsis at

which time she was admitted to the hospital.  She denies cough or any congestion or

hemoptysis, diarrhea, or vomiting.



HOME MEDICATIONS:

Home medications include:

1. Lantus 30 units daily.

2. Lexapro 20 daily.

3. Vitamin D 50,000 units once a week.

4. Tylenol.

5. Amitriptyline 25 at night.

6. Synthroid 75 mcg daily.

7. Prinivil 20 mg daily.

8. Melatonin 3 mg at night.

9. Norvasc 5 mg daily.

10.NovoLog a.c. and at bedtime.

11.Methenamine _____b.i.d.

12.Omeprazole 20 mg daily.

13.Prinivil 20 mg daily.

14.TriCor 160 at bedtime.

15.Calcium 600 b.i.d.



ALLERGIES:

See list, which apparently is negative.



REVIEW OF SYSTEMS:

Fourteen-point review of systems as mentioned above, otherwise negative.



PAST MEDICAL HISTORY:

Severe multiple sclerosis, hypothyroidism, chronic neuropathy, insulin-dependent

diabetes mellitus, prior CVA, TIA, COPD, hypertension, hypothyroidism, chronic pain

syndrome, history ESBL in the urine.



SURGERIES:

Adenoidectomy, tonsillectomy, right leg ORIF.



History of depression.



Former smoker.



FAMILY HISTORY:

Mother, father unknown.



PHYSICAL EXAMINATION:

Vital signs are reviewed.

She is awake, answering questions.  She appears very weak and fatigued.

Dry mucous membranes.

Skin is dry.

Normal conjunctivae.

NECK:  Supple.  No mass.

HEART:  Regular rate and rhythm.

RESPIRATORY: Breath sounds are equal.  No wheezes or stridor.

GI: Soft, nontender.  Normal bowel sounds.

MUSCULOSKELETAL: Three out of four extremity strength.

NEUROLOGIC:  Alert and oriented x2.  Speech is normal.

SKIN: As mentioned above.

PSYCH: Fair mood and affect.



T-max 100.4, pulse 76 to 78, blood pressure is 95 to 110 over 50 to 55, O2 92% to 95%

on room air.



White count 7.1, hemoglobin is 9.7.  S sodium was 137.  Influenza is negative.



ASSESSMENT:

1. Urosepsis.

2. Urinary tract infection.

3. Profound multiple sclerosis.

4. Hypothyroidism.

Please see further orders in the chart.  Get Neurology consult, Infectious Disease.

Continue on Zosyn for ESBL infection.





MMODL / IJN: [id number] / Job#: [id number]

## 2020-01-28 NOTE — ED
Fever HPI





- General


Chief Complaint: Fever


Stated Complaint: fever


Time Seen by Provider: 01/27/20 21:43


Source: EMS


Mode of arrival: EMS


Limitations: no limitations





- History of Present Illness


Initial Comments: 


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 

able to move the right upper extremity,  presenting today for cc of fever. 

Patient has history of frequent UTI. Patiennt only complaint is increased 

fatigue, general malaise. Denies abdominal pain, diarrhea,neck or head pain. 

Patient denies cough, or increasing weakness of the right UE. Patient denies 

chest pain or SOB. patient has 1 episode of vomiting at the nursing home on 

nieces arrival, and was found to be febrile. Given tylenol. Patien transported 

to the ER. Patient remaining ROS (-). Pt febrile on arrival.








- Related Data


                                Home Medications











 Medication  Instructions  Recorded  Confirmed


 


Docusate Sodium [Dulcolax Stool 200 mg PO DAILY@1200 01/31/15 01/28/20





Softener]   


 


Ergocalciferol [Vitamin D2 50,000 unit PO SA 01/31/15 01/28/20





(DRISDOL)]   


 


Escitalopram [Lexapro] 20 mg PO DAILY@0900 01/31/15 01/28/20


 


Lisinopril [Prinivil] 20 mg PO HS@2100 01/31/15 01/28/20


 


Omeprazole [PriLOSEC] 20 mg PO DAILY@0900 01/31/15 01/28/20


 


Sennosides [Senokot] 17.2 mg PO DAILY@1200 01/31/15 01/28/20


 


Naloxegol Oxalate [Movantik] 25 mg PO HS@2100 09/20/16 01/28/20


 


amLODIPine [Norvasc] 5 mg PO DAILY@1200 05/12/17 01/28/20


 


Amitriptyline HCl [Elavil] 50 mg PO HS@2100 01/28/20 01/28/20


 


Atorvastatin [Lipitor] 80 mg PO HS@2100 01/28/20 01/28/20


 


Dulaglutide [Trulicity] 0.75 mg SQ MO 01/28/20 01/28/20


 


Fenofibrate 160 mg PO HS@2100 01/28/20 01/28/20


 


Ferrous Sulfate [Iron (65  mg PO DAILY@1200 01/28/20 01/28/20





Elemental)]   


 


Levothyroxine Sodium [Synthroid] 100 mcg PO HS@2100 01/28/20 01/28/20


 


Melatonin 10 mg PO HS@2100 01/28/20 01/28/20


 


Memantine [Namenda] 5 mg PO DAILY@0900 01/28/20 01/28/20


 


diphenhydrAMINE [Benadryl] 25 mg PO HS@2100 01/28/20 01/28/20








                                  Previous Rx's











 Medication  Instructions  Recorded


 


Aspirin 81 mg PO DAILY  chew 05/15/17


 


Cefuroxime Axetil [Ceftin] 500 mg PO BID #14 tab 01/30/20


 


Diazepam [Valium] 5 mg PO TID PRN #9 tab 01/30/20


 


INSULIN LISPRO (HumaLOG) [humaLOG] 0 unit SQ ACHS #1 vial 01/30/20


 


Insulin Detemir (Levemir) [Levemir] 20 unit SQ DAILY@0700  syr 01/30/20


 


Levothyroxine Sodium [Synthroid] 75 mcg PO DAILY@0600  tab 01/30/20


 


traMADol HCL [Ultram ER] 300 mg PO DAILY 3 Days #3 tab 01/30/20


 


traMADol HCL [Ultram ER] 300 mg PO DAILY@0900 #3 tab 01/30/20











                                    Allergies











Allergy/AdvReac Type Severity Reaction Status Date / Time


 


No Known Allergies Allergy   Verified 01/28/20 09:25














Review of Systems


ROS Statement: 


Those systems with pertinent positive or pertinent negative responses have been 

documented in the HPI.





ROS Other: All systems not noted in ROS Statement are negative.





Past Medical History


Past Medical History: Chest Pain / Angina, COPD, CVA/TIA, Diabetes Mellitus, 

Hypertension, Memory Impairment, Neurologic Disorder, Thyroid Disorder


Additional Past Medical History / Comment(s): Current UTI, on suppression 

treatment, chronic constipation, abdominal chronic pain.


History of Any Multi-Drug Resistant Organisms: ESBL


Date of last positivie culture/infection: 11/11/19


MDRO Source:: ESBL URINE


Past Surgical History: Adenoidectomy, Tonsillectomy


Additional Past Surgical History / Comment(s): right leg ORIF, multiple scopes


Past Psychological History: Depression


Smoking Status: Former smoker


Past Alcohol Use History: None Reported


Past Drug Use History: None Reported





- Past Family History


  ** Mother


History Unknown: Yes





  ** Father


History Unknown: Yes





General Exam





- General Exam Comments


Initial Comments: 


General:  The patient is awake and alert, in no distress, appears unwell


Eye: +3 mm pupils are equal, round and reactive to light, extra-ocular movements

are intact.  No nystagmus.  There is normal conjunctiva bilaterally.  No signs 

of icterus.  


Ears, nose, mouth and throat:  There are moist mucous membranes and no oral 

lesions. 


Neck:  The neck is supple, there is no tenderness or JVD.  


Cardiovascular:  There is a regular rate and rhythm. No murmur, rub or gallop is

appreciated.


Respiratory:  Respirations are non-labored, breath sounds are equal.  No 

wheezes, stridor, rales. Some Rhonchi noted.


Gastrointestinal:  Soft, non-distended, non-tender abdomen without masses or 

organomegaly noted. There is no rebound or guarding present.   Bowel sounds are 

unremarkable.


Musculoskeletal:  Contracted 3/4 extremities, with movement of the right UE. 

Sensation intact of all 4 extremities. Radial and DP pulses equal bilaterally 

2+.  


Neurological:  A&O x 3.  There are no obvious motor or sensory deficits. 

Coordination appears grossly intact. Speech is normal.


Skin:  Skin is warm and dry and no rashes or lesions are noted. 


Psychiatric:  Cooperative





Limitations: no limitations





Course


                                   Vital Signs











  01/27/20 01/27/20 01/28/20





  21:58 23:07 07:00


 


Temperature 100.4 F H  97.0 F L


 


Pulse Rate 78 76 


 


Pulse Rate [   74





Pulse Oximetery   





]   


 


Respiratory 78 H 18 16





Rate   


 


Blood Pressure 110/55 95/59 


 


Blood Pressure   115/54





[Right Arm]   


 


O2 Sat by Pulse 92 L 95 92 L





Oximetry   














Medical Decision Making





- Medical Decision Making


68yo female presenting for cc of fever. History of UTI. Patient has no 

pneumonia, lungs clear. No signficciant leukocytosis. BP stable. Patient has no 

abdominal pain or other focal symptoms. Urine pending givne appearance I suspect

UTI as cause of infection, case was signed out to Dr. Anderson who will admit 

patient, and decide final disposition.








- Lab Data


Result diagrams: 


                                 01/30/20 02:55





                                 01/30/20 04:12


                                   Lab Results











  01/27/20 01/27/20 01/27/20 Range/Units





  20:16 20:16 22:23 


 


WBC  7.1    (3.8-10.6)  k/uL


 


RBC  3.29 L    (3.80-5.40)  m/uL


 


Hgb  9.7 L    (11.4-16.0)  gm/dL


 


Hct  29.7 L    (34.0-46.0)  %


 


MCV  90.2    (80.0-100.0)  fL


 


MCH  29.5    (25.0-35.0)  pg


 


MCHC  32.7    (31.0-37.0)  g/dL


 


RDW  14.8    (11.5-15.5)  %


 


Plt Count  256    (150-450)  k/uL


 


Neutrophils %  88    %


 


Lymphocytes %  8    %


 


Monocytes %  3    %


 


Eosinophils %  1    %


 


Basophils %  1    %


 


Neutrophils #  6.2    (1.3-7.7)  k/uL


 


Lymphocytes #  0.6 L    (1.0-4.8)  k/uL


 


Monocytes #  0.2    (0-1.0)  k/uL


 


Eosinophils #  0.1    (0-0.7)  k/uL


 


Basophils #  0.1    (0-0.2)  k/uL


 


Hypochromasia     


 


PT   12.1 H   (9.0-12.0)  sec


 


INR   1.2 H   (<1.2)  


 


APTT   24.3   (22.0-30.0)  sec


 


Sodium    137  (137-145)  mmol/L


 


Potassium      (3.5-5.1)  mmol/L


 


Chloride    107  ()  mmol/L


 


Carbon Dioxide    25  (22-30)  mmol/L


 


Anion Gap     mmol/L


 


BUN    42 H  (7-17)  mg/dL


 


Creatinine    0.86  (0.52-1.04)  mg/dL


 


Est GFR (CKD-EPI)AfAm    79  (>60 ml/min/1.73 sqM)  


 


Est GFR (CKD-EPI)NonAf    69  (>60 ml/min/1.73 sqM)  


 


Glucose    98  (74-99)  mg/dL


 


POC Glucose (mg/dL)     (75-99)  mg/dL


 


POC Glu Operater ID     


 


Estimated Ave Glu mg/dL     


 


Hemoglobin A1c     (4.0-6.0)  %


 


Plasma Lactic Acid Gal     (0.7-2.0)  mmol/L


 


Calcium    8.9  (8.4-10.2)  mg/dL


 


Total Bilirubin    1.6 H  (0.2-1.3)  mg/dL


 


AST    89 H  (14-36)  U/L


 


ALT    29  (4-34)  U/L


 


Alkaline Phosphatase    39  ()  U/L


 


Total Protein    6.9  (6.3-8.2)  g/dL


 


Albumin    3.3 L  (3.5-5.0)  g/dL


 


Urine Color     


 


Urine Appearance     (Clear)  


 


Urine pH     (5.0-8.0)  


 


Ur Specific Gravity     (1.001-1.035)  


 


Urine Protein     (Negative)  


 


Urine Glucose (UA)     (Negative)  


 


Urine Ketones     (Negative)  


 


Urine Blood     (Negative)  


 


Urine Nitrite     (Negative)  


 


Urine Bilirubin     (Negative)  


 


Urine Urobilinogen     (<2.0)  mg/dL


 


Ur Leukocyte Esterase     (Negative)  


 


Urine RBC     (0-5)  /hpf


 


Urine WBC     (0-5)  /hpf


 


Urine WBC Clumps     (None)  /hpf


 


Ur Squamous Epith Cells     (0-4)  /hpf


 


Urine Bacteria     (None)  /hpf


 


Urine Mucus     (None)  /hpf


 


Influenza Type A RNA     (Not Detectd)  


 


Influenza Type B (PCR)     (Not Detectd)  














  01/27/20 01/27/20 01/28/20 Range/Units





  22:23 23:04 01:40 


 


WBC     (3.8-10.6)  k/uL


 


RBC     (3.80-5.40)  m/uL


 


Hgb     (11.4-16.0)  gm/dL


 


Hct     (34.0-46.0)  %


 


MCV     (80.0-100.0)  fL


 


MCH     (25.0-35.0)  pg


 


MCHC     (31.0-37.0)  g/dL


 


RDW     (11.5-15.5)  %


 


Plt Count     (150-450)  k/uL


 


Neutrophils %     %


 


Lymphocytes %     %


 


Monocytes %     %


 


Eosinophils %     %


 


Basophils %     %


 


Neutrophils #     (1.3-7.7)  k/uL


 


Lymphocytes #     (1.0-4.8)  k/uL


 


Monocytes #     (0-1.0)  k/uL


 


Eosinophils #     (0-0.7)  k/uL


 


Basophils #     (0-0.2)  k/uL


 


Hypochromasia     


 


PT     (9.0-12.0)  sec


 


INR     (<1.2)  


 


APTT     (22.0-30.0)  sec


 


Sodium     (137-145)  mmol/L


 


Potassium     (3.5-5.1)  mmol/L


 


Chloride     ()  mmol/L


 


Carbon Dioxide     (22-30)  mmol/L


 


Anion Gap     mmol/L


 


BUN     (7-17)  mg/dL


 


Creatinine     (0.52-1.04)  mg/dL


 


Est GFR (CKD-EPI)AfAm     (>60 ml/min/1.73 sqM)  


 


Est GFR (CKD-EPI)NonAf     (>60 ml/min/1.73 sqM)  


 


Glucose     (74-99)  mg/dL


 


POC Glucose (mg/dL)     (75-99)  mg/dL


 


POC Glu Operater ID     


 


Estimated Ave Glu mg/dL     


 


Hemoglobin A1c     (4.0-6.0)  %


 


Plasma Lactic Acid Gal  1.6    (0.7-2.0)  mmol/L


 


Calcium     (8.4-10.2)  mg/dL


 


Total Bilirubin     (0.2-1.3)  mg/dL


 


AST     (14-36)  U/L


 


ALT     (4-34)  U/L


 


Alkaline Phosphatase     ()  U/L


 


Total Protein     (6.3-8.2)  g/dL


 


Albumin     (3.5-5.0)  g/dL


 


Urine Color    Yellow  


 


Urine Appearance    Cloudy H  (Clear)  


 


Urine pH    7.5  (5.0-8.0)  


 


Ur Specific Gravity    1.017  (1.001-1.035)  


 


Urine Protein    1+ H  (Negative)  


 


Urine Glucose (UA)    Negative  (Negative)  


 


Urine Ketones    Negative  (Negative)  


 


Urine Blood    Moderate H  (Negative)  


 


Urine Nitrite    Negative  (Negative)  


 


Urine Bilirubin    Negative  (Negative)  


 


Urine Urobilinogen    <2.0  (<2.0)  mg/dL


 


Ur Leukocyte Esterase    Large H  (Negative)  


 


Urine RBC    38 H  (0-5)  /hpf


 


Urine WBC    >182 H  (0-5)  /hpf


 


Urine WBC Clumps    Many H  (None)  /hpf


 


Ur Squamous Epith Cells     (0-4)  /hpf


 


Urine Bacteria    Occasional H  (None)  /hpf


 


Urine Mucus    Rare H  (None)  /hpf


 


Influenza Type A RNA   Not Detected   (Not Detectd)  


 


Influenza Type B (PCR)   Not Detected   (Not Detectd)  














  01/28/20 01/28/20 01/28/20 Range/Units





  08:53 09:14 11:24 


 


WBC     (3.8-10.6)  k/uL


 


RBC     (3.80-5.40)  m/uL


 


Hgb     (11.4-16.0)  gm/dL


 


Hct     (34.0-46.0)  %


 


MCV     (80.0-100.0)  fL


 


MCH     (25.0-35.0)  pg


 


MCHC     (31.0-37.0)  g/dL


 


RDW     (11.5-15.5)  %


 


Plt Count     (150-450)  k/uL


 


Neutrophils %     %


 


Lymphocytes %     %


 


Monocytes %     %


 


Eosinophils %     %


 


Basophils %     %


 


Neutrophils #     (1.3-7.7)  k/uL


 


Lymphocytes #     (1.0-4.8)  k/uL


 


Monocytes #     (0-1.0)  k/uL


 


Eosinophils #     (0-0.7)  k/uL


 


Basophils #     (0-0.2)  k/uL


 


Hypochromasia     


 


PT     (9.0-12.0)  sec


 


INR     (<1.2)  


 


APTT     (22.0-30.0)  sec


 


Sodium     (137-145)  mmol/L


 


Potassium     (3.5-5.1)  mmol/L


 


Chloride     ()  mmol/L


 


Carbon Dioxide     (22-30)  mmol/L


 


Anion Gap     mmol/L


 


BUN     (7-17)  mg/dL


 


Creatinine     (0.52-1.04)  mg/dL


 


Est GFR (CKD-EPI)AfAm     (>60 ml/min/1.73 sqM)  


 


Est GFR (CKD-EPI)NonAf     (>60 ml/min/1.73 sqM)  


 


Glucose     (74-99)  mg/dL


 


POC Glucose (mg/dL)  56 L  101 H  190 H  (75-99)  mg/dL


 


POC Glu Operater Glo Godinez Jennifer Montoya, Jennifer  


 


Estimated Ave Glu mg/dL     


 


Hemoglobin A1c     (4.0-6.0)  %


 


Plasma Lactic Acid Gal     (0.7-2.0)  mmol/L


 


Calcium     (8.4-10.2)  mg/dL


 


Total Bilirubin     (0.2-1.3)  mg/dL


 


AST     (14-36)  U/L


 


ALT     (4-34)  U/L


 


Alkaline Phosphatase     ()  U/L


 


Total Protein     (6.3-8.2)  g/dL


 


Albumin     (3.5-5.0)  g/dL


 


Urine Color     


 


Urine Appearance     (Clear)  


 


Urine pH     (5.0-8.0)  


 


Ur Specific Gravity     (1.001-1.035)  


 


Urine Protein     (Negative)  


 


Urine Glucose (UA)     (Negative)  


 


Urine Ketones     (Negative)  


 


Urine Blood     (Negative)  


 


Urine Nitrite     (Negative)  


 


Urine Bilirubin     (Negative)  


 


Urine Urobilinogen     (<2.0)  mg/dL


 


Ur Leukocyte Esterase     (Negative)  


 


Urine RBC     (0-5)  /hpf


 


Urine WBC     (0-5)  /hpf


 


Urine WBC Clumps     (None)  /hpf


 


Ur Squamous Epith Cells     (0-4)  /hpf


 


Urine Bacteria     (None)  /hpf


 


Urine Mucus     (None)  /hpf


 


Influenza Type A RNA     (Not Detectd)  


 


Influenza Type B (PCR)     (Not Detectd)  














  01/28/20 01/28/20 01/28/20 Range/Units





  17:25 20:23 22:08 


 


WBC     (3.8-10.6)  k/uL


 


RBC     (3.80-5.40)  m/uL


 


Hgb     (11.4-16.0)  gm/dL


 


Hct     (34.0-46.0)  %


 


MCV     (80.0-100.0)  fL


 


MCH     (25.0-35.0)  pg


 


MCHC     (31.0-37.0)  g/dL


 


RDW     (11.5-15.5)  %


 


Plt Count     (150-450)  k/uL


 


Neutrophils %     %


 


Lymphocytes %     %


 


Monocytes %     %


 


Eosinophils %     %


 


Basophils %     %


 


Neutrophils #     (1.3-7.7)  k/uL


 


Lymphocytes #     (1.0-4.8)  k/uL


 


Monocytes #     (0-1.0)  k/uL


 


Eosinophils #     (0-0.7)  k/uL


 


Basophils #     (0-0.2)  k/uL


 


Hypochromasia     


 


PT     (9.0-12.0)  sec


 


INR     (<1.2)  


 


APTT     (22.0-30.0)  sec


 


Sodium     (137-145)  mmol/L


 


Potassium     (3.5-5.1)  mmol/L


 


Chloride     ()  mmol/L


 


Carbon Dioxide     (22-30)  mmol/L


 


Anion Gap     mmol/L


 


BUN     (7-17)  mg/dL


 


Creatinine     (0.52-1.04)  mg/dL


 


Est GFR (CKD-EPI)AfAm     (>60 ml/min/1.73 sqM)  


 


Est GFR (CKD-EPI)NonAf     (>60 ml/min/1.73 sqM)  


 


Glucose     (74-99)  mg/dL


 


POC Glucose (mg/dL)  410 H  466 H  478 H  (75-99)  mg/dL


 


POC Glu Operater ID  Concetta Rebekah Cardenas, Dipika Dang, [address] Glu mg/dL     


 


Hemoglobin A1c     (4.0-6.0)  %


 


Plasma Lactic Acid Gal     (0.7-2.0)  mmol/L


 


Calcium     (8.4-10.2)  mg/dL


 


Total Bilirubin     (0.2-1.3)  mg/dL


 


AST     (14-36)  U/L


 


ALT     (4-34)  U/L


 


Alkaline Phosphatase     ()  U/L


 


Total Protein     (6.3-8.2)  g/dL


 


Albumin     (3.5-5.0)  g/dL


 


Urine Color     


 


Urine Appearance     (Clear)  


 


Urine pH     (5.0-8.0)  


 


Ur Specific Gravity     (1.001-1.035)  


 


Urine Protein     (Negative)  


 


Urine Glucose (UA)     (Negative)  


 


Urine Ketones     (Negative)  


 


Urine Blood     (Negative)  


 


Urine Nitrite     (Negative)  


 


Urine Bilirubin     (Negative)  


 


Urine Urobilinogen     (<2.0)  mg/dL


 


Ur Leukocyte Esterase     (Negative)  


 


Urine RBC     (0-5)  /hpf


 


Urine WBC     (0-5)  /hpf


 


Urine WBC Clumps     (None)  /hpf


 


Ur Squamous Epith Cells     (0-4)  /hpf


 


Urine Bacteria     (None)  /hpf


 


Urine Mucus     (None)  /hpf


 


Influenza Type A RNA     (Not Detectd)  


 


Influenza Type B (PCR)     (Not Detectd)  














  01/29/20 01/29/20 01/29/20 Range/Units





  05:44 05:46 05:59 


 


WBC     (3.8-10.6)  k/uL


 


RBC     (3.80-5.40)  m/uL


 


Hgb     (11.4-16.0)  gm/dL


 


Hct     (34.0-46.0)  %


 


MCV     (80.0-100.0)  fL


 


MCH     (25.0-35.0)  pg


 


MCHC     (31.0-37.0)  g/dL


 


RDW     (11.5-15.5)  %


 


Plt Count     (150-450)  k/uL


 


Neutrophils %     %


 


Lymphocytes %     %


 


Monocytes %     %


 


Eosinophils %     %


 


Basophils %     %


 


Neutrophils #     (1.3-7.7)  k/uL


 


Lymphocytes #     (1.0-4.8)  k/uL


 


Monocytes #     (0-1.0)  k/uL


 


Eosinophils #     (0-0.7)  k/uL


 


Basophils #     (0-0.2)  k/uL


 


Hypochromasia     


 


PT     (9.0-12.0)  sec


 


INR     (<1.2)  


 


APTT     (22.0-30.0)  sec


 


Sodium     (137-145)  mmol/L


 


Potassium     (3.5-5.1)  mmol/L


 


Chloride     ()  mmol/L


 


Carbon Dioxide     (22-30)  mmol/L


 


Anion Gap     mmol/L


 


BUN     (7-17)  mg/dL


 


Creatinine     (0.52-1.04)  mg/dL


 


Est GFR (CKD-EPI)AfAm     (>60 ml/min/1.73 sqM)  


 


Est GFR (CKD-EPI)NonAf     (>60 ml/min/1.73 sqM)  


 


Glucose     (74-99)  mg/dL


 


POC Glucose (mg/dL)  26 L  29 L  30 L  (75-99)  mg/dL


 


POC Glu Operater ID  Theresa, Dipika  Theresa, Dipika  Candelarioroberto carlos, Dipika 




 


Estimated Ave Glu mg/dL     


 


Hemoglobin A1c     (4.0-6.0)  %


 


Plasma Lactic Acid Gal     (0.7-2.0)  mmol/L


 


Calcium     (8.4-10.2)  mg/dL


 


Total Bilirubin     (0.2-1.3)  mg/dL


 


AST     (14-36)  U/L


 


ALT     (4-34)  U/L


 


Alkaline Phosphatase     ()  U/L


 


Total Protein     (6.3-8.2)  g/dL


 


Albumin     (3.5-5.0)  g/dL


 


Urine Color     


 


Urine Appearance     (Clear)  


 


Urine pH     (5.0-8.0)  


 


Ur Specific Gravity     (1.001-1.035)  


 


Urine Protein     (Negative)  


 


Urine Glucose (UA)     (Negative)  


 


Urine Ketones     (Negative)  


 


Urine Blood     (Negative)  


 


Urine Nitrite     (Negative)  


 


Urine Bilirubin     (Negative)  


 


Urine Urobilinogen     (<2.0)  mg/dL


 


Ur Leukocyte Esterase     (Negative)  


 


Urine RBC     (0-5)  /hpf


 


Urine WBC     (0-5)  /hpf


 


Urine WBC Clumps     (None)  /hpf


 


Ur Squamous Epith Cells     (0-4)  /hpf


 


Urine Bacteria     (None)  /hpf


 


Urine Mucus     (None)  /hpf


 


Influenza Type A RNA     (Not Detectd)  


 


Influenza Type B (PCR)     (Not Detectd)  














  01/29/20 01/29/20 01/29/20 Range/Units





  06:15 07:15 10:43 


 


WBC     (3.8-10.6)  k/uL


 


RBC     (3.80-5.40)  m/uL


 


Hgb     (11.4-16.0)  gm/dL


 


Hct     (34.0-46.0)  %


 


MCV     (80.0-100.0)  fL


 


MCH     (25.0-35.0)  pg


 


MCHC     (31.0-37.0)  g/dL


 


RDW     (11.5-15.5)  %


 


Plt Count     (150-450)  k/uL


 


Neutrophils %     %


 


Lymphocytes %     %


 


Monocytes %     %


 


Eosinophils %     %


 


Basophils %     %


 


Neutrophils #     (1.3-7.7)  k/uL


 


Lymphocytes #     (1.0-4.8)  k/uL


 


Monocytes #     (0-1.0)  k/uL


 


Eosinophils #     (0-0.7)  k/uL


 


Basophils #     (0-0.2)  k/uL


 


Hypochromasia     


 


PT     (9.0-12.0)  sec


 


INR     (<1.2)  


 


APTT     (22.0-30.0)  sec


 


Sodium     (137-145)  mmol/L


 


Potassium     (3.5-5.1)  mmol/L


 


Chloride     ()  mmol/L


 


Carbon Dioxide     (22-30)  mmol/L


 


Anion Gap     mmol/L


 


BUN     (7-17)  mg/dL


 


Creatinine     (0.52-1.04)  mg/dL


 


Est GFR (CKD-EPI)AfAm     (>60 ml/min/1.73 sqM)  


 


Est GFR (CKD-EPI)NonAf     (>60 ml/min/1.73 sqM)  


 


Glucose     (74-99)  mg/dL


 


POC Glucose (mg/dL)  137 H  137 H   (75-99)  mg/dL


 


POC Glu Operater ID  Theresa DipikaSindi Larios   


 


Estimated Ave Glu mg/dL    120  


 


Hemoglobin A1c    5.8  (4.0-6.0)  %


 


Plasma Lactic Acid Gal     (0.7-2.0)  mmol/L


 


Calcium     (8.4-10.2)  mg/dL


 


Total Bilirubin     (0.2-1.3)  mg/dL


 


AST     (14-36)  U/L


 


ALT     (4-34)  U/L


 


Alkaline Phosphatase     ()  U/L


 


Total Protein     (6.3-8.2)  g/dL


 


Albumin     (3.5-5.0)  g/dL


 


Urine Color     


 


Urine Appearance     (Clear)  


 


Urine pH     (5.0-8.0)  


 


Ur Specific Gravity     (1.001-1.035)  


 


Urine Protein     (Negative)  


 


Urine Glucose (UA)     (Negative)  


 


Urine Ketones     (Negative)  


 


Urine Blood     (Negative)  


 


Urine Nitrite     (Negative)  


 


Urine Bilirubin     (Negative)  


 


Urine Urobilinogen     (<2.0)  mg/dL


 


Ur Leukocyte Esterase     (Negative)  


 


Urine RBC     (0-5)  /hpf


 


Urine WBC     (0-5)  /hpf


 


Urine WBC Clumps     (None)  /hpf


 


Ur Squamous Epith Cells     (0-4)  /hpf


 


Urine Bacteria     (None)  /hpf


 


Urine Mucus     (None)  /hpf


 


Influenza Type A RNA     (Not Detectd)  


 


Influenza Type B (PCR)     (Not Detectd)  














  01/29/20 01/29/20 01/29/20 Range/Units





  10:43 10:43 11:41 


 


WBC   4.2   (3.8-10.6)  k/uL


 


RBC   2.69 L   (3.80-5.40)  m/uL


 


Hgb   7.8 L D   (11.4-16.0)  gm/dL


 


Hct   25.2 L   (34.0-46.0)  %


 


MCV   93.7   (80.0-100.0)  fL


 


MCH   29.0   (25.0-35.0)  pg


 


MCHC   31.0   (31.0-37.0)  g/dL


 


RDW   14.9   (11.5-15.5)  %


 


Plt Count   167   (150-450)  k/uL


 


Neutrophils %     %


 


Lymphocytes %     %


 


Monocytes %     %


 


Eosinophils %     %


 


Basophils %     %


 


Neutrophils #     (1.3-7.7)  k/uL


 


Lymphocytes #     (1.0-4.8)  k/uL


 


Monocytes #     (0-1.0)  k/uL


 


Eosinophils #     (0-0.7)  k/uL


 


Basophils #     (0-0.2)  k/uL


 


Hypochromasia   Slight   


 


PT     (9.0-12.0)  sec


 


INR     (<1.2)  


 


APTT     (22.0-30.0)  sec


 


Sodium  141    (137-145)  mmol/L


 


Potassium  3.9    (3.5-5.1)  mmol/L


 


Chloride  111 H    ()  mmol/L


 


Carbon Dioxide  24    (22-30)  mmol/L


 


Anion Gap  6    mmol/L


 


BUN  30 H    (7-17)  mg/dL


 


Creatinine  0.92    (0.52-1.04)  mg/dL


 


Est GFR (CKD-EPI)AfAm  73    (>60 ml/min/1.73 sqM)  


 


Est GFR (CKD-EPI)NonAf  63    (>60 ml/min/1.73 sqM)  


 


Glucose  63 L    (74-99)  mg/dL


 


POC Glucose (mg/dL)    48 L  (75-99)  mg/dL


 


POC Glu Operater ID    Sindi Partida  


 


Estimated Ave Glu mg/dL     


 


Hemoglobin A1c     (4.0-6.0)  %


 


Plasma Lactic Acid Gal     (0.7-2.0)  mmol/L


 


Calcium  8.4    (8.4-10.2)  mg/dL


 


Total Bilirubin     (0.2-1.3)  mg/dL


 


AST     (14-36)  U/L


 


ALT     (4-34)  U/L


 


Alkaline Phosphatase     ()  U/L


 


Total Protein     (6.3-8.2)  g/dL


 


Albumin     (3.5-5.0)  g/dL


 


Urine Color     


 


Urine Appearance     (Clear)  


 


Urine pH     (5.0-8.0)  


 


Ur Specific Gravity     (1.001-1.035)  


 


Urine Protein     (Negative)  


 


Urine Glucose (UA)     (Negative)  


 


Urine Ketones     (Negative)  


 


Urine Blood     (Negative)  


 


Urine Nitrite     (Negative)  


 


Urine Bilirubin     (Negative)  


 


Urine Urobilinogen     (<2.0)  mg/dL


 


Ur Leukocyte Esterase     (Negative)  


 


Urine RBC     (0-5)  /hpf


 


Urine WBC     (0-5)  /hpf


 


Urine WBC Clumps     (None)  /hpf


 


Ur Squamous Epith Cells     (0-4)  /hpf


 


Urine Bacteria     (None)  /hpf


 


Urine Mucus     (None)  /hpf


 


Influenza Type A RNA     (Not Detectd)  


 


Influenza Type B (PCR)     (Not Detectd)  














  01/29/20 01/29/20 01/29/20 Range/Units





  12:02 17:21 20:14 


 


WBC     (3.8-10.6)  k/uL


 


RBC     (3.80-5.40)  m/uL


 


Hgb     (11.4-16.0)  gm/dL


 


Hct     (34.0-46.0)  %


 


MCV     (80.0-100.0)  fL


 


MCH     (25.0-35.0)  pg


 


MCHC     (31.0-37.0)  g/dL


 


RDW     (11.5-15.5)  %


 


Plt Count     (150-450)  k/uL


 


Neutrophils %     %


 


Lymphocytes %     %


 


Monocytes %     %


 


Eosinophils %     %


 


Basophils %     %


 


Neutrophils #     (1.3-7.7)  k/uL


 


Lymphocytes #     (1.0-4.8)  k/uL


 


Monocytes #     (0-1.0)  k/uL


 


Eosinophils #     (0-0.7)  k/uL


 


Basophils #     (0-0.2)  k/uL


 


Hypochromasia     


 


PT     (9.0-12.0)  sec


 


INR     (<1.2)  


 


APTT     (22.0-30.0)  sec


 


Sodium     (137-145)  mmol/L


 


Potassium     (3.5-5.1)  mmol/L


 


Chloride     ()  mmol/L


 


Carbon Dioxide     (22-30)  mmol/L


 


Anion Gap     mmol/L


 


BUN     (7-17)  mg/dL


 


Creatinine     (0.52-1.04)  mg/dL


 


Est GFR (CKD-EPI)AfAm     (>60 ml/min/1.73 sqM)  


 


Est GFR (CKD-EPI)NonAf     (>60 ml/min/1.73 sqM)  


 


Glucose     (74-99)  mg/dL


 


POC Glucose (mg/dL)  96  206 H  282 H  (75-99)  mg/dL


 


POC Glu Operater Sindi Darling, Dipika Gallardo  


 


Estimated Ave Glu mg/dL     


 


Hemoglobin A1c     (4.0-6.0)  %


 


Plasma Lactic Acid Gal     (0.7-2.0)  mmol/L


 


Calcium     (8.4-10.2)  mg/dL


 


Total Bilirubin     (0.2-1.3)  mg/dL


 


AST     (14-36)  U/L


 


ALT     (4-34)  U/L


 


Alkaline Phosphatase     ()  U/L


 


Total Protein     (6.3-8.2)  g/dL


 


Albumin     (3.5-5.0)  g/dL


 


Urine Color     


 


Urine Appearance     (Clear)  


 


Urine pH     (5.0-8.0)  


 


Ur Specific Gravity     (1.001-1.035)  


 


Urine Protein     (Negative)  


 


Urine Glucose (UA)     (Negative)  


 


Urine Ketones     (Negative)  


 


Urine Blood     (Negative)  


 


Urine Nitrite     (Negative)  


 


Urine Bilirubin     (Negative)  


 


Urine Urobilinogen     (<2.0)  mg/dL


 


Ur Leukocyte Esterase     (Negative)  


 


Urine RBC     (0-5)  /hpf


 


Urine WBC     (0-5)  /hpf


 


Urine WBC Clumps     (None)  /hpf


 


Ur Squamous Epith Cells     (0-4)  /hpf


 


Urine Bacteria     (None)  /hpf


 


Urine Mucus     (None)  /hpf


 


Influenza Type A RNA     (Not Detectd)  


 


Influenza Type B (PCR)     (Not Detectd)  














  01/29/20 01/30/20 01/30/20 Range/Units





  22:32 02:01 02:03 


 


WBC     (3.8-10.6)  k/uL


 


RBC     (3.80-5.40)  m/uL


 


Hgb     (11.4-16.0)  gm/dL


 


Hct     (34.0-46.0)  %


 


MCV     (80.0-100.0)  fL


 


MCH     (25.0-35.0)  pg


 


MCHC     (31.0-37.0)  g/dL


 


RDW     (11.5-15.5)  %


 


Plt Count     (150-450)  k/uL


 


Neutrophils %     %


 


Lymphocytes %     %


 


Monocytes %     %


 


Eosinophils %     %


 


Basophils %     %


 


Neutrophils #     (1.3-7.7)  k/uL


 


Lymphocytes #     (1.0-4.8)  k/uL


 


Monocytes #     (0-1.0)  k/uL


 


Eosinophils #     (0-0.7)  k/uL


 


Basophils #     (0-0.2)  k/uL


 


Hypochromasia     


 


PT     (9.0-12.0)  sec


 


INR     (<1.2)  


 


APTT     (22.0-30.0)  sec


 


Sodium     (137-145)  mmol/L


 


Potassium     (3.5-5.1)  mmol/L


 


Chloride     ()  mmol/L


 


Carbon Dioxide     (22-30)  mmol/L


 


Anion Gap     mmol/L


 


BUN     (7-17)  mg/dL


 


Creatinine     (0.52-1.04)  mg/dL


 


Est GFR (CKD-EPI)AfAm     (>60 ml/min/1.73 sqM)  


 


Est GFR (CKD-EPI)NonAf     (>60 ml/min/1.73 sqM)  


 


Glucose     (74-99)  mg/dL


 


POC Glucose (mg/dL)   >600 H  >600 H  (75-99)  mg/dL


 


POC Glu Operater Dipika Dozier Karen  


 


Estimated Ave Glu mg/dL     


 


Hemoglobin A1c     (4.0-6.0)  %


 


Plasma Lactic Acid Gal     (0.7-2.0)  mmol/L


 


Calcium     (8.4-10.2)  mg/dL


 


Total Bilirubin     (0.2-1.3)  mg/dL


 


AST     (14-36)  U/L


 


ALT     (4-34)  U/L


 


Alkaline Phosphatase     ()  U/L


 


Total Protein     (6.3-8.2)  g/dL


 


Albumin     (3.5-5.0)  g/dL


 


Urine Color  Yellow    


 


Urine Appearance  Cloudy H    (Clear)  


 


Urine pH  5.5    (5.0-8.0)  


 


Ur Specific Gravity  1.026    (1.001-1.035)  


 


Urine Protein  Trace H    (Negative)  


 


Urine Glucose (UA)  Trace H    (Negative)  


 


Urine Ketones  Negative    (Negative)  


 


Urine Blood  Large H    (Negative)  


 


Urine Nitrite  Negative    (Negative)  


 


Urine Bilirubin  Negative    (Negative)  


 


Urine Urobilinogen  <2.0    (<2.0)  mg/dL


 


Ur Leukocyte Esterase  Large H    (Negative)  


 


Urine RBC  >182 H    (0-5)  /hpf


 


Urine WBC  115 H    (0-5)  /hpf


 


Urine WBC Clumps     (None)  /hpf


 


Ur Squamous Epith Cells  <1    (0-4)  /hpf


 


Urine Bacteria  Rare H    (None)  /hpf


 


Urine Mucus     (None)  /hpf


 


Influenza Type A RNA     (Not Detectd)  


 


Influenza Type B (PCR)     (Not Detectd)  














  01/30/20 01/30/20 01/30/20 Range/Units





  02:55 02:55 03:49 


 


WBC   2.9 L   (3.8-10.6)  k/uL


 


RBC   2.75 L   (3.80-5.40)  m/uL


 


Hgb   7.9 L   (11.4-16.0)  gm/dL


 


Hct   25.9 L   (34.0-46.0)  %


 


MCV   94.1   (80.0-100.0)  fL


 


MCH   28.7   (25.0-35.0)  pg


 


MCHC   30.5 L   (31.0-37.0)  g/dL


 


RDW   14.8   (11.5-15.5)  %


 


Plt Count   191   (150-450)  k/uL


 


Neutrophils %   52   %


 


Lymphocytes %   33   %


 


Monocytes %   7   %


 


Eosinophils %   6   %


 


Basophils %   1   %


 


Neutrophils #   1.5   (1.3-7.7)  k/uL


 


Lymphocytes #   1.0   (1.0-4.8)  k/uL


 


Monocytes #   0.2   (0-1.0)  k/uL


 


Eosinophils #   0.2   (0-0.7)  k/uL


 


Basophils #   0.0   (0-0.2)  k/uL


 


Hypochromasia   Slight   


 


PT     (9.0-12.0)  sec


 


INR     (<1.2)  


 


APTT     (22.0-30.0)  sec


 


Sodium  142    (137-145)  mmol/L


 


Potassium  3.6    (3.5-5.1)  mmol/L


 


Chloride  112 H    ()  mmol/L


 


Carbon Dioxide  26    (22-30)  mmol/L


 


Anion Gap  4    mmol/L


 


BUN  22 H    (7-17)  mg/dL


 


Creatinine  0.95    (0.52-1.04)  mg/dL


 


Est GFR (CKD-EPI)AfAm  70    (>60 ml/min/1.73 sqM)  


 


Est GFR (CKD-EPI)NonAf  61    (>60 ml/min/1.73 sqM)  


 


Glucose  135 H    (74-99)  mg/dL


 


POC Glucose (mg/dL)    375 H  (75-99)  mg/dL


 


POC Glu Operater ID    Dipika Cardenas  


 


Estimated Ave Glu mg/dL     


 


Hemoglobin A1c     (4.0-6.0)  %


 


Plasma Lactic Acid Gal     (0.7-2.0)  mmol/L


 


Calcium  8.2 L    (8.4-10.2)  mg/dL


 


Total Bilirubin     (0.2-1.3)  mg/dL


 


AST     (14-36)  U/L


 


ALT     (4-34)  U/L


 


Alkaline Phosphatase     ()  U/L


 


Total Protein     (6.3-8.2)  g/dL


 


Albumin     (3.5-5.0)  g/dL


 


Urine Color     


 


Urine Appearance     (Clear)  


 


Urine pH     (5.0-8.0)  


 


Ur Specific Gravity     (1.001-1.035)  


 


Urine Protein     (Negative)  


 


Urine Glucose (UA)     (Negative)  


 


Urine Ketones     (Negative)  


 


Urine Blood     (Negative)  


 


Urine Nitrite     (Negative)  


 


Urine Bilirubin     (Negative)  


 


Urine Urobilinogen     (<2.0)  mg/dL


 


Ur Leukocyte Esterase     (Negative)  


 


Urine RBC     (0-5)  /hpf


 


Urine WBC     (0-5)  /hpf


 


Urine WBC Clumps     (None)  /hpf


 


Ur Squamous Epith Cells     (0-4)  /hpf


 


Urine Bacteria     (None)  /hpf


 


Urine Mucus     (None)  /hpf


 


Influenza Type A RNA     (Not Detectd)  


 


Influenza Type B (PCR)     (Not Detectd)  














  01/30/20 01/30/20 01/30/20 Range/Units





  04:12 07:06 07:09 


 


WBC     (3.8-10.6)  k/uL


 


RBC     (3.80-5.40)  m/uL


 


Hgb     (11.4-16.0)  gm/dL


 


Hct     (34.0-46.0)  %


 


MCV     (80.0-100.0)  fL


 


MCH     (25.0-35.0)  pg


 


MCHC     (31.0-37.0)  g/dL


 


RDW     (11.5-15.5)  %


 


Plt Count     (150-450)  k/uL


 


Neutrophils %     %


 


Lymphocytes %     %


 


Monocytes %     %


 


Eosinophils %     %


 


Basophils %     %


 


Neutrophils #     (1.3-7.7)  k/uL


 


Lymphocytes #     (1.0-4.8)  k/uL


 


Monocytes #     (0-1.0)  k/uL


 


Eosinophils #     (0-0.7)  k/uL


 


Basophils #     (0-0.2)  k/uL


 


Hypochromasia     


 


PT     (9.0-12.0)  sec


 


INR     (<1.2)  


 


APTT     (22.0-30.0)  sec


 


Sodium     (137-145)  mmol/L


 


Potassium     (3.5-5.1)  mmol/L


 


Chloride     ()  mmol/L


 


Carbon Dioxide     (22-30)  mmol/L


 


Anion Gap     mmol/L


 


BUN     (7-17)  mg/dL


 


Creatinine     (0.52-1.04)  mg/dL


 


Est GFR (CKD-EPI)AfAm     (>60 ml/min/1.73 sqM)  


 


Est GFR (CKD-EPI)NonAf     (>60 ml/min/1.73 sqM)  


 


Glucose  98    (74-99)  mg/dL


 


POC Glucose (mg/dL)   46 L  41 L  (75-99)  mg/dL


 


POC Glu Operater Sindi Darling Laura  


 


Estimated Ave Glu mg/dL     


 


Hemoglobin A1c     (4.0-6.0)  %


 


Plasma Lactic Acid Gal     (0.7-2.0)  mmol/L


 


Calcium     (8.4-10.2)  mg/dL


 


Total Bilirubin     (0.2-1.3)  mg/dL


 


AST     (14-36)  U/L


 


ALT     (4-34)  U/L


 


Alkaline Phosphatase     ()  U/L


 


Total Protein     (6.3-8.2)  g/dL


 


Albumin     (3.5-5.0)  g/dL


 


Urine Color     


 


Urine Appearance     (Clear)  


 


Urine pH     (5.0-8.0)  


 


Ur Specific Gravity     (1.001-1.035)  


 


Urine Protein     (Negative)  


 


Urine Glucose (UA)     (Negative)  


 


Urine Ketones     (Negative)  


 


Urine Blood     (Negative)  


 


Urine Nitrite     (Negative)  


 


Urine Bilirubin     (Negative)  


 


Urine Urobilinogen     (<2.0)  mg/dL


 


Ur Leukocyte Esterase     (Negative)  


 


Urine RBC     (0-5)  /hpf


 


Urine WBC     (0-5)  /hpf


 


Urine WBC Clumps     (None)  /hpf


 


Ur Squamous Epith Cells     (0-4)  /hpf


 


Urine Bacteria     (None)  /hpf


 


Urine Mucus     (None)  /hpf


 


Influenza Type A RNA     (Not Detectd)  


 


Influenza Type B (PCR)     (Not Detectd)  














  01/30/20 01/30/20 Range/Units





  07:38 08:00 


 


WBC    (3.8-10.6)  k/uL


 


RBC    (3.80-5.40)  m/uL


 


Hgb    (11.4-16.0)  gm/dL


 


Hct    (34.0-46.0)  %


 


MCV    (80.0-100.0)  fL


 


MCH    (25.0-35.0)  pg


 


MCHC    (31.0-37.0)  g/dL


 


RDW    (11.5-15.5)  %


 


Plt Count    (150-450)  k/uL


 


Neutrophils %    %


 


Lymphocytes %    %


 


Monocytes %    %


 


Eosinophils %    %


 


Basophils %    %


 


Neutrophils #    (1.3-7.7)  k/uL


 


Lymphocytes #    (1.0-4.8)  k/uL


 


Monocytes #    (0-1.0)  k/uL


 


Eosinophils #    (0-0.7)  k/uL


 


Basophils #    (0-0.2)  k/uL


 


Hypochromasia    


 


PT    (9.0-12.0)  sec


 


INR    (<1.2)  


 


APTT    (22.0-30.0)  sec


 


Sodium    (137-145)  mmol/L


 


Potassium    (3.5-5.1)  mmol/L


 


Chloride    ()  mmol/L


 


Carbon Dioxide    (22-30)  mmol/L


 


Anion Gap    mmol/L


 


BUN    (7-17)  mg/dL


 


Creatinine    (0.52-1.04)  mg/dL


 


Est GFR (CKD-EPI)AfAm    (>60 ml/min/1.73 sqM)  


 


Est GFR (CKD-EPI)NonAf    (>60 ml/min/1.73 sqM)  


 


Glucose    (74-99)  mg/dL


 


POC Glucose (mg/dL)  72 L  86  (75-99)  mg/dL


 


POC Glu Operater Sindi Darling, Sindi  


 


Estimated Ave Glu mg/dL    


 


Hemoglobin A1c    (4.0-6.0)  %


 


Plasma Lactic Acid Gal    (0.7-2.0)  mmol/L


 


Calcium    (8.4-10.2)  mg/dL


 


Total Bilirubin    (0.2-1.3)  mg/dL


 


AST    (14-36)  U/L


 


ALT    (4-34)  U/L


 


Alkaline Phosphatase    ()  U/L


 


Total Protein    (6.3-8.2)  g/dL


 


Albumin    (3.5-5.0)  g/dL


 


Urine Color    


 


Urine Appearance    (Clear)  


 


Urine pH    (5.0-8.0)  


 


Ur Specific Gravity    (1.001-1.035)  


 


Urine Protein    (Negative)  


 


Urine Glucose (UA)    (Negative)  


 


Urine Ketones    (Negative)  


 


Urine Blood    (Negative)  


 


Urine Nitrite    (Negative)  


 


Urine Bilirubin    (Negative)  


 


Urine Urobilinogen    (<2.0)  mg/dL


 


Ur Leukocyte Esterase    (Negative)  


 


Urine RBC    (0-5)  /hpf


 


Urine WBC    (0-5)  /hpf


 


Urine WBC Clumps    (None)  /hpf


 


Ur Squamous Epith Cells    (0-4)  /hpf


 


Urine Bacteria    (None)  /hpf


 


Urine Mucus    (None)  /hpf


 


Influenza Type A RNA    (Not Detectd)  


 


Influenza Type B (PCR)    (Not Detectd)  














Disposition


Clinical Impression: 


 UTI (urinary tract infection), Fever





Disposition: ADMITTED AS IP TO THIS [facility]


Condition: Stable


Is patient prescribed a controlled substance at d/c from ED?: No


Time of Disposition: 03:51


Decision to Admit Reason: Admit from EC


Decision Date: 01/27/20


Decision Time: 12:30

## 2020-01-29 LAB
ANION GAP SERPL CALC-SCNC: 6 MMOL/L
BUN SERPL-SCNC: 30 MG/DL (ref 7–17)
CALCIUM SPEC-MCNC: 8.4 MG/DL (ref 8.4–10.2)
CHLORIDE SERPL-SCNC: 111 MMOL/L (ref 98–107)
CO2 SERPL-SCNC: 24 MMOL/L (ref 22–30)
ERYTHROCYTE [DISTWIDTH] IN BLOOD BY AUTOMATED COUNT: 2.69 M/UL (ref 3.8–5.4)
ERYTHROCYTE [DISTWIDTH] IN BLOOD: 14.9 % (ref 11.5–15.5)
GLUCOSE BLD-MCNC: 137 MG/DL (ref 75–99)
GLUCOSE BLD-MCNC: 137 MG/DL (ref 75–99)
GLUCOSE BLD-MCNC: 206 MG/DL (ref 75–99)
GLUCOSE BLD-MCNC: 282 MG/DL (ref 75–99)
GLUCOSE BLD-MCNC: 29 MG/DL (ref 75–99)
GLUCOSE BLD-MCNC: 30 MG/DL (ref 75–99)
GLUCOSE BLD-MCNC: 48 MG/DL (ref 75–99)
GLUCOSE BLD-MCNC: 96 MG/DL (ref 75–99)
GLUCOSE SERPL-MCNC: 63 MG/DL (ref 74–99)
HCT VFR BLD AUTO: 25.2 % (ref 34–46)
HGB BLD-MCNC: 7.8 GM/DL (ref 11.4–16)
MCH RBC QN AUTO: 29 PG (ref 25–35)
MCHC RBC AUTO-ENTMCNC: 31 G/DL (ref 31–37)
MCV RBC AUTO: 93.7 FL (ref 80–100)
PH UR: 5.5 [PH] (ref 5–8)
PLATELET # BLD AUTO: 167 K/UL (ref 150–450)
POTASSIUM SERPL-SCNC: 3.9 MMOL/L (ref 3.5–5.1)
RBC UR QL: >182 /HPF (ref 0–5)
SODIUM SERPL-SCNC: 141 MMOL/L (ref 137–145)
SP GR UR: 1.03 (ref 1–1.03)
SQUAMOUS UR QL AUTO: <1 /HPF (ref 0–4)
UROBILINOGEN UR QL STRIP: <2 MG/DL (ref ?–2)
WBC # BLD AUTO: 4.2 K/UL (ref 3.8–10.6)
WBC # UR AUTO: 115 /HPF (ref 0–5)

## 2020-01-29 RX ADMIN — INSULIN ASPART SCH: 100 INJECTION, SOLUTION INTRAVENOUS; SUBCUTANEOUS at 13:13

## 2020-01-29 RX ADMIN — DIAZEPAM PRN MG: 5 TABLET ORAL at 07:59

## 2020-01-29 RX ADMIN — CEFAZOLIN SCH MLS/HR: 330 INJECTION, POWDER, FOR SOLUTION INTRAMUSCULAR; INTRAVENOUS at 03:32

## 2020-01-29 RX ADMIN — ESCITALOPRAM OXALATE SCH MG: 20 TABLET, FILM COATED ORAL at 07:59

## 2020-01-29 RX ADMIN — INSULIN DETEMIR SCH: 100 INJECTION, SOLUTION SUBCUTANEOUS at 11:36

## 2020-01-29 RX ADMIN — CEFAZOLIN SCH MLS/HR: 330 INJECTION, POWDER, FOR SOLUTION INTRAMUSCULAR; INTRAVENOUS at 23:22

## 2020-01-29 RX ADMIN — Medication SCH MG: at 13:20

## 2020-01-29 RX ADMIN — ASPIRIN 81 MG CHEWABLE TABLET SCH MG: 81 TABLET CHEWABLE at 07:59

## 2020-01-29 RX ADMIN — DOCUSATE SODIUM SCH MG: 100 CAPSULE, LIQUID FILLED ORAL at 13:20

## 2020-01-29 RX ADMIN — CEFAZOLIN SCH MLS/HR: 330 INJECTION, POWDER, FOR SOLUTION INTRAMUSCULAR; INTRAVENOUS at 13:21

## 2020-01-29 RX ADMIN — INSULIN ASPART SCH: 100 INJECTION, SOLUTION INTRAVENOUS; SUBCUTANEOUS at 20:22

## 2020-01-29 RX ADMIN — AMITRIPTYLINE HYDROCHLORIDE SCH MG: 50 TABLET, FILM COATED ORAL at 20:46

## 2020-01-29 RX ADMIN — INSULIN ASPART SCH UNIT: 100 INJECTION, SOLUTION INTRAVENOUS; SUBCUTANEOUS at 17:38

## 2020-01-29 RX ADMIN — ASPIRIN SCH: 325 TABLET ORAL at 20:35

## 2020-01-29 RX ADMIN — PIPERACILLIN AND TAZOBACTAM SCH MLS/HR: 3; .375 INJECTION, POWDER, FOR SOLUTION INTRAVENOUS at 23:22

## 2020-01-29 RX ADMIN — INSULIN DETEMIR SCH UNIT: 100 INJECTION, SOLUTION SUBCUTANEOUS at 20:46

## 2020-01-29 RX ADMIN — SODIUM FERRIC GLUCONATE COMPLEX SCH MLS/HR: 12.5 INJECTION INTRAVENOUS at 15:24

## 2020-01-29 RX ADMIN — FENOFIBRATE SCH MG: 160 TABLET ORAL at 20:45

## 2020-01-29 RX ADMIN — DRONABINOL SCH: 2.5 CAPSULE ORAL at 11:37

## 2020-01-29 RX ADMIN — LEVOTHYROXINE SODIUM SCH MCG: 75 TABLET ORAL at 05:31

## 2020-01-29 RX ADMIN — MEMANTINE HYDROCHLORIDE SCH MG: 5 TABLET ORAL at 07:59

## 2020-01-29 RX ADMIN — SENNOSIDES SCH MG: 8.6 TABLET, FILM COATED ORAL at 13:21

## 2020-01-29 RX ADMIN — LEVOTHYROXINE SODIUM SCH MCG: 100 TABLET ORAL at 20:46

## 2020-01-29 RX ADMIN — Medication SCH MG: at 20:45

## 2020-01-29 RX ADMIN — DIAZEPAM PRN MG: 5 TABLET ORAL at 20:46

## 2020-01-29 RX ADMIN — INSULIN ASPART SCH: 100 INJECTION, SOLUTION INTRAVENOUS; SUBCUTANEOUS at 07:36

## 2020-01-29 RX ADMIN — PIPERACILLIN AND TAZOBACTAM SCH MLS/HR: 3; .375 INJECTION, POWDER, FOR SOLUTION INTRAVENOUS at 09:22

## 2020-01-29 RX ADMIN — PANTOPRAZOLE SODIUM SCH MG: 40 TABLET, DELAYED RELEASE ORAL at 07:59

## 2020-01-29 RX ADMIN — PIPERACILLIN AND TAZOBACTAM SCH MLS/HR: 3; .375 INJECTION, POWDER, FOR SOLUTION INTRAVENOUS at 17:01

## 2020-01-29 RX ADMIN — LISINOPRIL SCH MG: 20 TABLET ORAL at 20:45

## 2020-01-29 RX ADMIN — ATORVASTATIN CALCIUM SCH MG: 80 TABLET, FILM COATED ORAL at 20:46

## 2020-01-29 NOTE — MR
EXAMINATION TYPE: MR brain wo/w con

 

DATE OF EXAM: 1/29/2020 8:51 AM

 

COMPARISON: 12/28/2018

 

HISTORY: MS

 

CONTRAST: Patient received 6.5 mL intravenous Gadavist gadolinium contrast.  

 

Multiplanar and multispin-echo imaging of the brain was performed .  Pre and post contrast enhanced i
mages are obtained.

 

The ventricles, basal cisterns and sulci overlying the cerebral convexities are moderately enlarged. 
 

 

There is evidence of moderate periventricular white matter ischemic demyelination.  Confluency 2 lesi
ons noted within the periventricular regions and centrum semiovale which are nonspecific. Overall almaz
earance is stable. Considerable mass.

 

Remote deep white matter insults are also noted.  

 

No acute edema is seen on diffusion weighted  imaging.   

 

There is no evidence for midline shift or mass effect.  

 

Acute intracranial hemorrhage or extra-axial collection is not evident.   

 

No enhancing lesions are seen.  

 

The paranasal sinuses and mastoid air cells are well-aerated.

 

IMPRESSION:  

1. Stable features of MS.

2. Age-related atrophic and chronic small vessel ischemic change. No acute cranial process identified
.

## 2020-01-29 NOTE — P.PN
Progress Note - Text


Progress Note Date: 01/29/20





SUBJECTIVE/INTERVAL EVENTS:


Pt sleeping. Able to wake up with verbal stimuli. No acute overnight events. 

Patient feels sick overall but no pain. 





PHYSICAL EXAMINATION:


VITAL SIGNS: T 96.9 HR 69 RR 18 BP 91/54 O2 sat 94% on RA


GEN.: NAD, pleasant and cooperative


HEENT: NCAT, sclera without icterus


NECK: Supple, no carotid bruit


SKIN AND EXTREMITIES: Warm to touch, no edema





NEURO:


MENTAL STATUS: Patient alert and oriented to self, place, time.  Able to name 

the current president.  Speech fluent, able to name and repeat, following all 

commands readily.  No right and left disorientation, neglect.





CRANIAL NERVES II THROUGH XII: II: Pupils are equal and reactive to light 

symmetrically. Visual fields are intact to confrontation. III, IV, VI:  No 

ptosis.  Extraocular movements full. V: Facial sensation intact from V1-3. VII. 

No clear facial asymmetry.  IX, X: Symmetric palate elevation. XI: Shoulder 

shrug intact. XII: Tongue midline without fasciculation or atrophy.





MOTOR: Increased tone in LUE and LLE. Patient with difficulty moving her 

LUE/LLE, which is patient's baseline. No pronator drift or tremor.  Strength is 

5/5 in RUE and RLE.





SENSORY: Intact to light touch in all 4 extremities.  





REFLEXES: 2+ in RUE/RLE.  Toes are mute.  





COORDINATION:  Finger to nose intact with RUE.  No dysmetria.





GAIT: deferred





DIAGNOSTIC TESTING:





LABORATORY:


WBC 7.1 Hgb 9.7 Platelet 256 Na 137 Cl 107 CO2 25 BUN 42 Cr 0.86 glucose 98 AST 

89 ALT 29 AlkPhos 39 urinalysis positive leukesterase, WBC





IMAGING:


MRI brain w/ and w/o contrast 1/29/2020:


Stable features of MS. Age-related atrophic and small vessel ischemic changes. 


MRI brain w/ and w/o contrast 12/28/18:


Mild to moderate diffuse cerebral atrophy and nonspecific white matter changes 

redemonstrate. No enhancing lesions. Stable features of multiple sclerosis. 

Periventricular lesions noted. No significant change from MRI obtained in 2016.





ASSESSMENT:


69 year-old woman with PMhx of multiple sclerosis, COPD, DM, HTN, memory 

impairment, hypothyroidism, depression, presented to [facility] with 

complaints of fever, consulting Neurology for progressive MS. Patient with no 

clear MS exacerbation symptoms. Patient states that she does not ever want to be

started on disease modifying medications. Patient is okay with steroids if she 

needs it. MRI brain w/ and w/o contrast with no contrast enhancing lesions, MS 

features stable. 





RECOMMENDATIONS:


1. No further studies or treatment with medication recommended at this time. 


2. Neurology will sign off at this time. Please contact with additional 

questions or concerns.

## 2020-01-29 NOTE — PN
PROGRESS NOTE



DATE OF SERVICE:

01/29/2020



REASON FOR FOLLOWUP:

Urinary tract infection.



INTERVAL HISTORY:

The patient is currently afebrile.  The patient is breathing comfortably.  Denies

having any chest pain or any cough.  No nausea, no vomiting.  No abdominal pain or pain

to the lower leg.



PHYSICAL EXAMINATION:

Blood pressure is 123/70 with pulse of 88, temperature 98.5. She is 98% on room air.

General description is an elderly female lying in bed in no distress.

RESPIRATORY SYSTEM: Unlabored breathing. Clear to auscultation anteriorly.

HEART: S1, S2.  Regular rate and rhythm.

ABDOMEN: Soft. No tenderness.



LABS:

Hemoglobin 7.1, white count of 4.2, BUN of 30, creatinine 0.92.  Urine was positive.

Culture so far negative.



DIAGNOSTIC IMPRESSION AND PLAN:

Patient presented to hospital with mental status changes and fever; concern for urinary

tract infection.  The patient did have a significantly positive UA.  The patient is

currently on Zosyn.  Blood cultures have been negative so far.  Initial x-ray did show

a question of bilateral atelectasis with concern for possible pneumonia.  X-rays will

be repeated. Zosyn will be continued, and monitor clinical course closely.





MMODL / IJN: [id number] / Job#: [id number]

## 2020-01-29 NOTE — CONS
CONSULTATION



DATE OF SERVICE:

01/28/2020



REASON FOR CONSULTATION:

Urinary tract infection.



HISTORY OF PRESENT ILLNESS:

The patient is a 69-year-old  female who was brought in last night for

evaluation of fever and generalized weakness in this patient who did have history of

multiple sclerosis.  Patient had been complaining of increased fatigue and generalized

malaise with the symptoms that he has been getting worse for the last few days.  No

clear history of abdominal pain.  No nausea, vomiting, or any diarrhea.  The patient

denies significant burning or frequency of urine. _____ remains to have generalized

weakness and body aches. On arrival to the ER, the patient did have a fever of 100.4.

White count was normal at 7.1.  The patient did have positive UA with large leukocyte

esterase and more than 182 WBCs.  Cultures pending.  Influenza serology was negative.

The patient did have a chest x-ray that was reported for some patchy bilateral

atelectasis and fibrotic changes. Patient was started on Zosyn.  Infectious Disease was

consulted for further recommendations regarding antibiotic therapy.



REVIEW OF SYSTEMS:

Positive points have been mentioned in HPI.  Rest of systems negative.



PAST MEDICAL HISTORY:

CVA, TIA, diabetes mellitus, hypertension, COPD, angina, recurrent UTI.



PAST SURGICAL HISTORY:

Tonsillectomy, adenoidectomy, right leg ORIF.



SOCIAL HISTORY:

Remote history of smoking. No drinking or drug use.



FAMILY HISTORY:

No pertinent findings noticed.



ALLERGIES:

No known drug allergies.



MEDICATIONS:

Medications currently include the patient is on Tylenol, Elavil, Norvasc, aspirin,

Lipitor, Valium, Benadryl, Colace, Marinol, vitamin D2, Lexapro, Lofibra, iron sulfate,

Levemir, Synthroid, melatonin, Namenda, Narcan, and Zosyn.



PHYSICAL EXAMINATION:

Blood pressure 136/70 with a pulse of 95, temperature 97.5.  She is 94% on room air.

General description is an elderly female lying in bed in no distress.  No tachypnea or

accessory muscle of respiration use.

HEENT:  Examination shows slight pallor.  No scleral icterus.  Oral mucous membranes

dry.  No pharyngeal erythema or thrush

NECK; Trachea central. No thyromegaly.

LUNGS: Unlabored breathing, clear to auscultation anteriorly.  No wheeze or crackles.

HEART: S1, S2.  Regular rate and rhythm.  No added sounds.

ABDOMEN:  Soft. No tenderness. No guarding or rigidity.

EXTREMITIES:  No edema of feet.

SKIN EXAMINATION: No rash or mass palpable.

NEUROLOGICALLY:  Patient is awake, alert, oriented x2.  Mood and affect normal.



LABS:

Hemoglobin 9.7, white count 7.1.  BUN of 42, creatinine 0.86.  Electrolytes have been

normal.  Bilirubin elevated 1.6.  Urine has been positive.  Influenza serology was

negative.



DIAGNOSTIC IMPRESSION AND PLAN:

Patient presented to hospital with fever and weakness.  This patient did have a

positive UA with a history of recurrent urinary tract infections, likely representing

symptomatic urinary tract infection in this patient currently with no other obvious

focus of infection.



PLAN:

1. Zosyn 3.375 grams q.8 _____the patient, fever responded, will continue.

2. Gentle IV fluid.

3. We will follow up on clinical condition and culture to further adjust medication if

    needed.

Thank you for this consultation. Will follow this patient along with you.





MMODL / IJN: [id number] / Job#: [id number]

## 2020-01-30 VITALS — DIASTOLIC BLOOD PRESSURE: 80 MMHG | SYSTOLIC BLOOD PRESSURE: 155 MMHG | TEMPERATURE: 98.9 F | HEART RATE: 102 BPM

## 2020-01-30 VITALS — RESPIRATION RATE: 18 BRPM

## 2020-01-30 LAB
ANION GAP SERPL CALC-SCNC: 4 MMOL/L
BASOPHILS # BLD AUTO: 0 K/UL (ref 0–0.2)
BASOPHILS NFR BLD AUTO: 1 %
BUN SERPL-SCNC: 22 MG/DL (ref 7–17)
CALCIUM SPEC-MCNC: 8.2 MG/DL (ref 8.4–10.2)
CHLORIDE SERPL-SCNC: 112 MMOL/L (ref 98–107)
CO2 SERPL-SCNC: 26 MMOL/L (ref 22–30)
EOSINOPHIL # BLD AUTO: 0.2 K/UL (ref 0–0.7)
EOSINOPHIL NFR BLD AUTO: 6 %
ERYTHROCYTE [DISTWIDTH] IN BLOOD BY AUTOMATED COUNT: 2.75 M/UL (ref 3.8–5.4)
ERYTHROCYTE [DISTWIDTH] IN BLOOD: 14.8 % (ref 11.5–15.5)
GLUCOSE BLD-MCNC: 196 MG/DL (ref 75–99)
GLUCOSE BLD-MCNC: 375 MG/DL (ref 75–99)
GLUCOSE BLD-MCNC: 41 MG/DL (ref 75–99)
GLUCOSE BLD-MCNC: 46 MG/DL (ref 75–99)
GLUCOSE BLD-MCNC: 72 MG/DL (ref 75–99)
GLUCOSE BLD-MCNC: 86 MG/DL (ref 75–99)
GLUCOSE BLD-MCNC: >600 MG/DL (ref 75–99)
GLUCOSE SERPL-MCNC: 135 MG/DL (ref 74–99)
HBA1C MFR BLD: 5.8 % (ref 4–6)
HCT VFR BLD AUTO: 25.9 % (ref 34–46)
HGB BLD-MCNC: 7.9 GM/DL (ref 11.4–16)
LYMPHOCYTES # SPEC AUTO: 1 K/UL (ref 1–4.8)
LYMPHOCYTES NFR SPEC AUTO: 33 %
MCH RBC QN AUTO: 28.7 PG (ref 25–35)
MCHC RBC AUTO-ENTMCNC: 30.5 G/DL (ref 31–37)
MCV RBC AUTO: 94.1 FL (ref 80–100)
MONOCYTES # BLD AUTO: 0.2 K/UL (ref 0–1)
MONOCYTES NFR BLD AUTO: 7 %
NEUTROPHILS # BLD AUTO: 1.5 K/UL (ref 1.3–7.7)
NEUTROPHILS NFR BLD AUTO: 52 %
PLATELET # BLD AUTO: 191 K/UL (ref 150–450)
POTASSIUM SERPL-SCNC: 3.6 MMOL/L (ref 3.5–5.1)
SODIUM SERPL-SCNC: 142 MMOL/L (ref 137–145)
WBC # BLD AUTO: 2.9 K/UL (ref 3.8–10.6)

## 2020-01-30 RX ADMIN — INSULIN DETEMIR SCH: 100 INJECTION, SOLUTION SUBCUTANEOUS at 09:58

## 2020-01-30 RX ADMIN — ESCITALOPRAM OXALATE SCH MG: 20 TABLET, FILM COATED ORAL at 09:10

## 2020-01-30 RX ADMIN — SODIUM FERRIC GLUCONATE COMPLEX SCH MLS/HR: 12.5 INJECTION INTRAVENOUS at 09:08

## 2020-01-30 RX ADMIN — ASPIRIN 81 MG CHEWABLE TABLET SCH MG: 81 TABLET CHEWABLE at 09:10

## 2020-01-30 RX ADMIN — INSULIN ASPART SCH UNIT: 100 INJECTION, SOLUTION INTRAVENOUS; SUBCUTANEOUS at 13:04

## 2020-01-30 RX ADMIN — SENNOSIDES SCH MG: 8.6 TABLET, FILM COATED ORAL at 09:09

## 2020-01-30 RX ADMIN — Medication SCH MG: at 09:10

## 2020-01-30 RX ADMIN — CEFAZOLIN SCH MLS/HR: 330 INJECTION, POWDER, FOR SOLUTION INTRAMUSCULAR; INTRAVENOUS at 09:12

## 2020-01-30 RX ADMIN — DOCUSATE SODIUM SCH MG: 100 CAPSULE, LIQUID FILLED ORAL at 09:10

## 2020-01-30 RX ADMIN — LEVOTHYROXINE SODIUM SCH MCG: 75 TABLET ORAL at 06:38

## 2020-01-30 RX ADMIN — PANTOPRAZOLE SODIUM SCH MG: 40 TABLET, DELAYED RELEASE ORAL at 09:10

## 2020-01-30 RX ADMIN — INSULIN ASPART SCH: 100 INJECTION, SOLUTION INTRAVENOUS; SUBCUTANEOUS at 08:54

## 2020-01-30 RX ADMIN — PIPERACILLIN AND TAZOBACTAM SCH MLS/HR: 3; .375 INJECTION, POWDER, FOR SOLUTION INTRAVENOUS at 10:19

## 2020-01-30 RX ADMIN — MEMANTINE HYDROCHLORIDE SCH MG: 5 TABLET ORAL at 09:10

## 2020-01-30 NOTE — CDI
Documentation Clarification Form



Date: 01/30/2020 11:44:48 AM

From: Whitley Che

Phone: [phone]

MRN: [id number]

Admit Date: 01/30/2020 09:04:00 AM

Patient Name: Keena Villa

Visit Number: [id number]

Discharge Date:  



ATTENTION: The Clinical Documentation Specialists (CDI) and [facility] Coding Staff 
appreciate your assistance in clarifying documentation. Please respond to the 
clarification below the line at the bottom and electronically sign. The CDI & 
[facility] Coding staff will review the response and follow-up if needed. Please note: 
Queries are made part of the Legal Health Record. If you have any questions, 
please contact the author of this message via ITS.



Dr. Compa Ta



Urosepsis is being documented in the H & P 1/28/20 and in subsequent progress 
note



History/Risk Factors: 69-year-old female presents to the ED with fever, chills, 
altered mental status Progressive multiple sclerosis with 3 to 4 extremity 
weakness. Diagnosed with UTI. H&P 1/28/ 2020

Clinical Indicators:

Labs: 1/27 Wbc 7.1; Hgb 9.7; Lymphocytes 0.6;  

Blood cultures:  1/27 no growth after 24 hours 1/28 UA Leukocyte esterase large,
Wbc >182 ,  

UA 1/29 Received 

Vitals signs on admission 1/27/20 110/55 78 100.4 18 92% room air 

Treatment:

ID Consult 1/28 The patient did have a positive UA with a history of recurrent 
urinary tract infections, likely representing symptomatic urinary tract 
infection in this patient currently with no other obvious focus of infection."

Neurology PN 1/29 MRI/brain w/ and without contrast with no contrast enhancing 
lesions, MS features stable. 

Antibiotics: 1/27 Rocpehin ivpb x; 1/28 Piperacillin Ivpb 1/27 0.9ns 1L bolus 
followed by 100cchr



In your professional opinion, please clarify if these findings signify one of 
the following conditions, whether the condition is POA, and cause, if known:

   

*  UTI with Sepsis POA

*  UTI POA 

*  Other, please specify _____________

*  Unable to determine

   

SIRS Criteria (2 or more of the following may indicate SIRS):-Temperature   < 
96.8F (36C) or > 101.0F (38.3C)

-Heart Rate   > 90 bpm

-Respiratory Rate   > 20 breaths/min or PaCO2 < 32 mmHg

-White Blood Cell Count   > 12,000 or < 4,000 cells/mm3 or > 10% bands

-Lactate   >2.0 mmol/L (>4.0 is equivalent to septic shock)



(Last Revision: April 2018)

MTDD

## 2020-01-30 NOTE — XR
EXAMINATION TYPE: XR chest 1V

 

DATE OF EXAM: 1/30/2020

 

COMPARISON: Prior chest x-ray 1/27/2020

 

HISTORY: Pneumonia

 

TECHNIQUE: Single frontal view of the chest is obtained.

 

FINDINGS:  Patient is rotated. Patchy basilar density is again seen. Heart is stable. No evident pneu
mothorax or pleural effusion.

 

IMPRESSION:  Rotated exam. No significant interval change. Findings may represent atelectasis or scar
ring. Follow-up PA and lateral chest x-ray suggested when patient is clinically stable.

## 2020-01-30 NOTE — CDI
Documentation Clarification Form



Date: 01/30/2020 11:09:32 AM

From: Whitley Che

Phone: [phone]

MRN: [id number]

Admit Date: 01/30/2020 09:04:00 AM

Patient Name: Keena Villa

Visit Number: [id number]

Discharge Date:  





ATTENTION: The Clinical Documentation Specialists (CDI) and [facility] Coding Staff 
appreciate your assistance in clarifying documentation. Please respond to the 
clarification below the line at the bottom and electronically sign. The CDI & 
[facility] Coding staff will review the response and follow-up if needed. Please note: 
Queries are made part of the Legal Health Record. If you have any questions, 
please contact the author of this message via ITS.



Dr. Compa Ta





69-year-old white female complaining of fever, chills, altered mental status is 
documented in the H & P 1/28/2020



History/Risk Factors: Medical history Severe multiple sclerosis, hypothyroidism,
chronic neuropathy, DM, history of ESBL in urine, 

Clinical Indicators:

ID Consult 1/28 The patient did have a postive UA with a history of recurrent 
urinary tract infections, likely representing symptomatic urinary tract 
infection  in this patient currently with no other obvious focus of infection."

Neurology PN 1/29 MRI/brain w/ and without contrast with no contrast enhancing 
lesions, MS features stable. 

Labs: 1/27 Wbc 7.1; Hgb 9.7; Lymphocytes 0.6; 

X Ray: Chest 1/27/20 There is patchy bilateral atelectasis and fibrotic change 
that is new compared to old exam 

MRI Brain: 1/29/20   Age related atrophic and chronic small vessel ischemic 
change.  No acute process 

Treatment:  1/27 Rocpehin ivpb x1; 0.9ns 1L bolus followed by 100cc/hr;  1/28 
Piperacillin Ivpb; 



In your professional opinion, please clarify the etiology of the Altered Mental 
Status, if known.



*  Metabolic Encephalopathy (specify Type and Underlying Medical Illness) 
  ___________

*  Other condition (please specify) _________________

*  Unable to determine



(Last Revision: April 2018)

___________________________________________________________________________



MTDD

## 2020-01-30 NOTE — P.DS
Providers


Date of admission: 


01/30/20 09:04





Expected date of discharge: 01/30/20


Attending physician: 


Comap Ta





Consults: 





                                        





01/28/20 09:46


Consult Physician Routine 


   Consulting Provider: Solomon Rodrigues


   Consult Reason/Comments: urosepsis


   Do you want consulting provider notified?: Yes





01/28/20 09:47


Consult Physician Routine 


   Consulting Provider: Ambreen Perdue


   Consult Reason/Comments: MS, progressive


   Do you want consulting provider notified?: Yes











Primary care physician: 


Compa Drewlamagdy





[facility] Course: 


Mental status changes accompanied by fever, sepsis with recurrent acute UTI 

currently ruled out, final urine culture 1/29 pending with results to be faxed 

to Dr. Compa Ta.  Possible early pneumonia, but no cough.  Suspect 

atelectasis.


Dehydration secondary to decreased oral intake.


Diabetes mellitus, uncontrolled, both hyperglycemia and hypoglycemia.  

Malfunctioning glucometer on nursing unit.


Profile multiple sclerosis


Hypothyroidism
































This is a 69-year-old female resident of an [facility] presented with mental status 

changes, acute metabolic encephalopathy, accompanied by fever, with concern for 

sepsis with recurrent UTI, progressive MS.  Chest x-ray reporting atelectasis.  

Blood cultures negative at 48 hours, urine culture of 1/28 negative.  Urine 

culture of 1/29 pending. Evaluated by infectious disease and neurology.  

Maintain on IV antibiotics, gentle IV fluid hydration.  Neuro workup completed. 

Cleared by consults for discharge.  Patient is being discharged back to [facility] 

subacute rehab.  In a stable condition with guarded prognosis.  Lantus dose has 

been decreased by half, sliding scale further adjusted to start it blood sugars 

of 150.  No sliding scale if no diet intake.  Patient will require close 

monitoring of Accu-Cheks before meals and at bedtime.








EXAM:





GENERAL: Alert and oriented 2, no acute distress


CARDIOVASCULAR:  S1, S2 regular.. No murmur


RESPIRATION: Breath sounds diminished in the bases. No rhonchi or crackles.


ABDOMEN:  Soft,  nontender . No guarding. no masses palpable. Bowel sounds 

heard.


NERVOUS SYSTEM:  Cranial N 2-12 grossly normal.  Diffuse weakness-multiple 

sclerosis in 3-4 extremity weakness














The impression and plan of care has been dictated as directed.





:


I performed a history and examination of this patient,  discussed the same with 

the dictator.  I agree with the dictator's note ,documented as a scribe.  Any 

additional findings or plans will be noted.








Patient Condition at Discharge: Stable





Plan - Discharge Summary


New Discharge Prescriptions: 


New


   Cefuroxime Axetil [Ceftin] 500 mg PO BID #14 tab


   Levothyroxine Sodium [Synthroid] 75 mcg PO DAILY@0600  tab


   traMADol HCL [Ultram ER] 300 mg PO DAILY 3 Days #3 tab


   INSULIN LISPRO (HumaLOG) [humaLOG] 0 unit SQ ACHS #1 vial


   Insulin Detemir (Levemir) [Levemir] 20 unit SQ DAILY@0700  syr


   Diazepam [Valium] 5 mg PO TID PRN #9 tab


     PRN Reason: Spasms





Continue


   Docusate Sodium [Dulcolax Stool Softener] 200 mg PO DAILY@1200


   Ergocalciferol [Vitamin D2 (DRISDOL)] 50,000 unit PO SA


   Escitalopram [Lexapro] 20 mg PO DAILY@0900


   Lisinopril [Prinivil] 20 mg PO HS@2100


   Omeprazole [PriLOSEC] 20 mg PO DAILY@0900


   Sennosides [Senokot] 17.2 mg PO DAILY@1200


   Naloxegol Oxalate [Movantik] 25 mg PO HS@2100


   amLODIPine [Norvasc] 5 mg PO DAILY@1200


   Aspirin 81 mg PO DAILY  chew


   Dulaglutide [Trulicity] 0.75 mg SQ MO


   Fenofibrate 160 mg PO HS@2100


   Memantine [Namenda] 5 mg PO DAILY@0900


   Melatonin 10 mg PO HS@2100


   Ferrous Sulfate [Iron (65 MG Elemental)] 325 mg PO DAILY@1200


   Amitriptyline HCl [Elavil] 50 mg PO HS@2100


   diphenhydrAMINE [Benadryl] 25 mg PO HS@2100


   Atorvastatin [Lipitor] 80 mg PO HS@2100


   traMADol HCL [Ultram ER] 300 mg PO DAILY@0900 #3 tab





Discontinued


   INSULIN ASPART (NovoLOG) [NovoLOG (formulary)] See Protocol SQ HS


   Insulin Glargine,Hum.rec.anlog [Basaglar Kwikpen U-100] 20 unit SQ 

BID@0800,2100





No Action


   Levothyroxine Sodium [Synthroid] 100 mcg PO HS@2100


Discharge Medication List





Docusate Sodium [Dulcolax Stool Softener] 200 mg PO DAILY@1200 01/31/15 

[History]


Ergocalciferol [Vitamin D2 (DRISDOL)] 50,000 unit PO SA 01/31/15 [History]


Escitalopram [Lexapro] 20 mg PO DAILY@0900 01/31/15 [History]


Lisinopril [Prinivil] 20 mg PO HS@2100 01/31/15 [History]


Omeprazole [PriLOSEC] 20 mg PO DAILY@0900 01/31/15 [History]


Sennosides [Senokot] 17.2 mg PO DAILY@1200 01/31/15 [History]


Naloxegol Oxalate [Movantik] 25 mg PO HS@2100 09/20/16 [History]


amLODIPine [Norvasc] 5 mg PO DAILY@1200 05/12/17 [History]


Aspirin 81 mg PO DAILY  chew 05/15/17 [Rx]


Amitriptyline HCl [Elavil] 50 mg PO HS@2100 01/28/20 [History]


Atorvastatin [Lipitor] 80 mg PO HS@2100 01/28/20 [History]


Dulaglutide [Trulicity] 0.75 mg SQ MO 01/28/20 [History]


Fenofibrate 160 mg PO HS@2100 01/28/20 [History]


Ferrous Sulfate [Iron (65 MG Elemental)] 325 mg PO DAILY@1200 01/28/20 [History]


Levothyroxine Sodium [Synthroid] 100 mcg PO HS@2100 01/28/20 [History]


Melatonin 10 mg PO HS@2100 01/28/20 [History]


Memantine [Namenda] 5 mg PO DAILY@0900 01/28/20 [History]


diphenhydrAMINE [Benadryl] 25 mg PO HS@2100 01/28/20 [History]


Cefuroxime Axetil [Ceftin] 500 mg PO BID #14 tab 01/30/20 [Rx]


Diazepam [Valium] 5 mg PO TID PRN #9 tab 01/30/20 [Rx]


INSULIN LISPRO (HumaLOG) [humaLOG] 0 unit SQ ACHS #1 vial 01/30/20 [Rx]


Insulin Detemir (Levemir) [Levemir] 20 unit SQ DAILY@0700  syr 01/30/20 [Rx]


Levothyroxine Sodium [Synthroid] 75 mcg PO DAILY@0600  tab 01/30/20 [Rx]


traMADol HCL [Ultram ER] 300 mg PO DAILY 3 Days #3 tab 01/30/20 [Rx]


traMADol HCL [Ultram ER] 300 mg PO DAILY@0900 #3 tab 01/30/20 [Rx]








Follow up Appointment(s)/Referral(s): 


Compa Ta MD [Primary Care Provider] - 3 Days (office not answering)


Activity/Diet/Wound Care/Special Instructions: 


Ranjan GUAJARDO, CARLITA in 3 days








Diet: Consistent carb








Activity: As tolerated, patient uses a Kalyan lift








********Do not use Humalog insulin sliding scale patient not eating*******


Discharge Disposition: TRANSFER TO SNF/[facility]

## 2020-01-30 NOTE — PN
PROGRESS NOTE



DATE OF SERVICE:

01/13/2020



REASON FOR FOLLOWUP:

Fever, possible UTI versus pneumonia.



INTERVAL HISTORY:

The patient is currently afebrile.  Patient has been breathing comfortably.  The

patient denies having any chest pain, minimal cough.  No nausea, no vomiting.  No

abdominal pain, no diarrhea.



PHYSICAL EXAMINATION:

Blood pressure 155/80 with a pulse of 90, temperature 98.9, she is 100% on room air.

General description is an elderly female, lying in bed in no distress.

RESPIRATORY SYSTEM: Unlabored breathing.  Decreased breath sounds at the base. No

wheeze.

HEART:  S1, S2.  Regular rate and rhythm.

ABDOMEN:  Soft, no tenderness.



LABS:

Hemoglobin 7.8, white count of 4.9, BUN of 22, creatinine 0.95.  Influenza serology was

negative.  Repeat urine is positive.



DIAGNOSTIC IMPRESSION AND PLAN:

Patient admitted to the hospital with a fever with concern for urinary tract infection.

The patient did have significant positive culture, subsequently came back negative, all

repeat UAs were positive.  X-ray, some atelectasis versus pneumonia.  She will finish

therapy with a short course of oral Ceftin as well as resistant organism has been grown

in the culture and continue supportive care.





MMODL / IJN: [id number] / Job#: [id number]

## 2020-02-01 NOTE — DS
DISCHARGE SUMMARY



ADDENDUM TO DISCHARGE SUMMARY:

1. Metabolic encephalopathy.

2. Urinary tract infection with sepsis, POA.





MMODL / IJN: [id number] / Job#: [id number]